# Patient Record
Sex: MALE | Race: WHITE | NOT HISPANIC OR LATINO | Employment: FULL TIME | URBAN - METROPOLITAN AREA
[De-identification: names, ages, dates, MRNs, and addresses within clinical notes are randomized per-mention and may not be internally consistent; named-entity substitution may affect disease eponyms.]

---

## 2017-12-29 DIAGNOSIS — N40.0 ENLARGED PROSTATE WITHOUT LOWER URINARY TRACT SYMPTOMS (LUTS): ICD-10-CM

## 2018-02-21 ENCOUNTER — OFFICE VISIT (OUTPATIENT)
Dept: UROLOGY | Facility: MEDICAL CENTER | Age: 63
End: 2018-02-21
Payer: COMMERCIAL

## 2018-02-21 VITALS
WEIGHT: 210 LBS | HEIGHT: 67 IN | BODY MASS INDEX: 32.96 KG/M2 | DIASTOLIC BLOOD PRESSURE: 80 MMHG | SYSTOLIC BLOOD PRESSURE: 142 MMHG

## 2018-02-21 DIAGNOSIS — N40.0 BENIGN PROSTATIC HYPERPLASIA WITHOUT LOWER URINARY TRACT SYMPTOMS: Primary | ICD-10-CM

## 2018-02-21 LAB
SL AMB  POCT GLUCOSE, UA: NORMAL
SL AMB LEUKOCYTE ESTERASE,UA: NEGATIVE
SL AMB POCT BILIRUBIN,UA: NEGATIVE
SL AMB POCT BLOOD,UA: NEGATIVE
SL AMB POCT CLARITY,UA: CLEAR
SL AMB POCT COLOR,UA: YELLOW
SL AMB POCT KETONES,UA: NEGATIVE
SL AMB POCT NITRITE,UA: NEGATIVE
SL AMB POCT PH,UA: 6
SL AMB POCT SPECIFIC GRAVITY,UA: 1.02
SL AMB POCT URINE PROTEIN: NEGATIVE
SL AMB POCT UROBILINOGEN: 1

## 2018-02-21 PROCEDURE — 99214 OFFICE O/P EST MOD 30 MIN: CPT | Performed by: UROLOGY

## 2018-02-21 PROCEDURE — 81003 URINALYSIS AUTO W/O SCOPE: CPT | Performed by: UROLOGY

## 2018-02-21 RX ORDER — SIMVASTATIN 20 MG
TABLET ORAL DAILY
COMMUNITY

## 2018-02-21 RX ORDER — HYDROCHLOROTHIAZIDE 25 MG/1
25 TABLET ORAL DAILY
COMMUNITY
Start: 2017-03-27

## 2018-02-21 RX ORDER — TAMSULOSIN HYDROCHLORIDE 0.4 MG/1
0.4 CAPSULE ORAL DAILY
COMMUNITY
Start: 2017-03-27 | End: 2018-02-21 | Stop reason: SDUPTHER

## 2018-02-21 RX ORDER — FINASTERIDE 5 MG/1
5 TABLET, FILM COATED ORAL DAILY
COMMUNITY
Start: 2016-11-03 | End: 2018-02-21 | Stop reason: SDUPTHER

## 2018-02-21 RX ORDER — FINASTERIDE 5 MG/1
5 TABLET, FILM COATED ORAL DAILY
Qty: 90 TABLET | Refills: 3 | Status: SHIPPED | OUTPATIENT
Start: 2018-02-21 | End: 2019-03-06

## 2018-02-21 RX ORDER — TAMSULOSIN HYDROCHLORIDE 0.4 MG/1
0.4 CAPSULE ORAL DAILY
Qty: 90 CAPSULE | Refills: 3 | Status: SHIPPED | OUTPATIENT
Start: 2018-02-21 | End: 2019-03-06

## 2018-02-21 RX ORDER — ZOLPIDEM TARTRATE 10 MG/1
TABLET ORAL DAILY
COMMUNITY

## 2018-02-21 RX ORDER — QUINAPRIL 40 MG/1
TABLET ORAL DAILY
COMMUNITY
Start: 2017-12-21

## 2018-02-21 NOTE — PROGRESS NOTES
IPSS Questionnaire (AUA-7): Over the past month    1)  How often have you had a sensation of not emptying your bladder completely after you finish urinating? 4 - More than half the time   2)  How often have you had to urinate again less than two hours after you finished urinating? 3 - About half the time   3)  How often have you found you stopped and started again several times when you urinated? 2 - Less than half the time   4) How difficult have you found it to postpone urination? 1 - Less than 1 time in 5   5) How often have you had a weak urinary stream?  3 - About half the time   6) How often have you had to push or strain to begin urination? 1 - Less than 1 time in 5   7) How many times did you most typically get up to urinate from the time you went to bed until the time you got up in the morning?   2 - 2 times   Total Score:  16

## 2018-02-21 NOTE — LETTER
February 21, 2018     DO Fadia Moon    Patient: Maribel Miranda   YOB: 1955   Date of Visit: 2/21/2018       Dear Dr Calderon Beebe: Thank you for referring Dolores López to me for evaluation  Below are my notes for this consultation  If you have questions, please do not hesitate to call me  I look forward to following your patient along with you  Sincerely,        Skyler Diaz MD        CC: No Recipients  Skyler Diaz MD  2/21/2018  1:24 PM  Sign at close encounter  Assessment/Plan:    Assessment:  1  Benign prostatic hyperplasia with obstruction  2  Remote history of testicular cancer  3  History of erectile dysfunction, currently inactive    Plan:  1  Return in 1 year  2  Obtain PSA  3  Re new medications    No problem-specific Assessment & Plan notes found for this encounter  Diagnoses and all orders for this visit:    Benign prostatic hyperplasia without lower urinary tract symptoms  -     POCT urine dip auto non-scope    Other orders  -     quinapril (ACCUPRIL) 40 MG tablet; daily  -     zolpidem (AMBIEN) 10 mg tablet; Take by mouth daily  -     tamsulosin (FLOMAX) 0 4 mg; Take 0 4 mg by mouth daily  -     simvastatin (ZOCOR) 20 mg tablet; Take by mouth daily  -     metFORMIN (GLUCOPHAGE) 500 mg tablet; Take 500 mg by mouth 2 (two) times a day  -     hydrochlorothiazide (HYDRODIURIL) 25 mg tablet; Take 25 mg by mouth daily  -     finasteride (PROSCAR) 5 mg tablet; Take 5 mg by mouth daily  -     ASPIRIN 81 PO; Take 81 mg by mouth daily  -     MEGARED OMEGA-3 KRILL OIL PO; Take by mouth daily          Subjective:      Patient ID: Maribel Miranda is a 57 y o  male  HPI  He notes urinary frequency, incomplete emptying, weak stream He denies other significant urinary symptoms  He denies gross hematuria, urinary tract infections or incontinence  He is taking tamsulosin and finasteride for his symptoms  PSA pending  Remote hx of testis cancer  Inactive problem  Hx of E D but pt and wife  content to live with situation  NIDDM - Good control  The following portions of the patient's history were reviewed and updated as appropriate: allergies, current medications, past family history, past medical history, past social history, past surgical history and problem list     Review of Systems   Constitutional: Negative for activity change and fatigue  NIDDM   Respiratory: Negative for shortness of breath and wheezing  Cardiovascular: Negative for chest pain  Gastrointestinal: Negative for abdominal pain  Genitourinary: Negative for difficulty urinating, dysuria, frequency, hematuria and urgency  Musculoskeletal: Negative for back pain and gait problem  Skin: Negative  Allergic/Immunologic: Negative  Neurological: Negative  Psychiatric/Behavioral: Negative  Objective:      /80 (BP Location: Left arm, Patient Position: Sitting, Cuff Size: Standard)   Ht 5' 7" (1 702 m)   Wt 95 3 kg (210 lb)   BMI 32 89 kg/m²           Physical Exam   Constitutional: He is oriented to person, place, and time  He appears well-developed and well-nourished  HENT:   Head: Normocephalic and atraumatic  Pulmonary/Chest: Effort normal    Abdominal: Soft  Bowel sounds are normal    Genitourinary:   Genitourinary Comments: The patient's prostate is approximately 40 g in size  Landmarks in texture normal   Anal sphincter tone is normal   Perineal structures are normal    Musculoskeletal: Normal range of motion  Neurological: He is alert and oriented to person, place, and time  Skin: Skin is warm and dry  Psychiatric: He has a normal mood and affect   His behavior is normal  Judgment and thought content normal

## 2018-02-21 NOTE — PROGRESS NOTES
Assessment/Plan:    Assessment:  1  Benign prostatic hyperplasia with obstruction  2  Remote history of testicular cancer  3  History of erectile dysfunction, currently inactive    Plan:  1  Return in 1 year  2  Obtain PSA  3  Re new medications    No problem-specific Assessment & Plan notes found for this encounter  Diagnoses and all orders for this visit:    Benign prostatic hyperplasia without lower urinary tract symptoms  -     POCT urine dip auto non-scope    Other orders  -     quinapril (ACCUPRIL) 40 MG tablet; daily  -     zolpidem (AMBIEN) 10 mg tablet; Take by mouth daily  -     tamsulosin (FLOMAX) 0 4 mg; Take 0 4 mg by mouth daily  -     simvastatin (ZOCOR) 20 mg tablet; Take by mouth daily  -     metFORMIN (GLUCOPHAGE) 500 mg tablet; Take 500 mg by mouth 2 (two) times a day  -     hydrochlorothiazide (HYDRODIURIL) 25 mg tablet; Take 25 mg by mouth daily  -     finasteride (PROSCAR) 5 mg tablet; Take 5 mg by mouth daily  -     ASPIRIN 81 PO; Take 81 mg by mouth daily  -     MEGARED OMEGA-3 KRILL OIL PO; Take by mouth daily          Subjective:      Patient ID: Hieu Merino is a 57 y o  male  HPI  He notes urinary frequency, incomplete emptying, weak stream He denies other significant urinary symptoms  He denies gross hematuria, urinary tract infections or incontinence  He is taking tamsulosin and finasteride for his symptoms  PSA pending  Remote hx of testis cancer  Inactive problem  Hx of E D but pt and wife  content to live with situation  NIDDM - Good control  The following portions of the patient's history were reviewed and updated as appropriate: allergies, current medications, past family history, past medical history, past social history, past surgical history and problem list     Review of Systems   Constitutional: Negative for activity change and fatigue  NIDDM   Respiratory: Negative for shortness of breath and wheezing      Cardiovascular: Negative for chest pain  Gastrointestinal: Negative for abdominal pain  Genitourinary: Negative for difficulty urinating, dysuria, frequency, hematuria and urgency  Musculoskeletal: Negative for back pain and gait problem  Skin: Negative  Allergic/Immunologic: Negative  Neurological: Negative  Psychiatric/Behavioral: Negative  Objective:      /80 (BP Location: Left arm, Patient Position: Sitting, Cuff Size: Standard)   Ht 5' 7" (1 702 m)   Wt 95 3 kg (210 lb)   BMI 32 89 kg/m²          Physical Exam   Constitutional: He is oriented to person, place, and time  He appears well-developed and well-nourished  HENT:   Head: Normocephalic and atraumatic  Pulmonary/Chest: Effort normal    Abdominal: Soft  Bowel sounds are normal    Genitourinary:   Genitourinary Comments: The patient's prostate is approximately 40 g in size  Landmarks in texture normal   Anal sphincter tone is normal   Perineal structures are normal    Musculoskeletal: Normal range of motion  Neurological: He is alert and oriented to person, place, and time  Skin: Skin is warm and dry  Psychiatric: He has a normal mood and affect   His behavior is normal  Judgment and thought content normal

## 2018-04-03 ENCOUNTER — TELEPHONE (OUTPATIENT)
Dept: UROLOGY | Facility: MEDICAL CENTER | Age: 63
End: 2018-04-03

## 2019-03-06 ENCOUNTER — OFFICE VISIT (OUTPATIENT)
Dept: UROLOGY | Facility: MEDICAL CENTER | Age: 64
End: 2019-03-06
Payer: COMMERCIAL

## 2019-03-06 VITALS
BODY MASS INDEX: 33.74 KG/M2 | WEIGHT: 215 LBS | SYSTOLIC BLOOD PRESSURE: 120 MMHG | DIASTOLIC BLOOD PRESSURE: 76 MMHG | HEIGHT: 67 IN | HEART RATE: 86 BPM

## 2019-03-06 DIAGNOSIS — N40.0 ENLARGED PROSTATE WITHOUT LOWER URINARY TRACT SYMPTOMS (LUTS): Primary | ICD-10-CM

## 2019-03-06 LAB
SL AMB  POCT GLUCOSE, UA: NEGATIVE
SL AMB LEUKOCYTE ESTERASE,UA: NEGATIVE
SL AMB POCT BILIRUBIN,UA: NEGATIVE
SL AMB POCT BLOOD,UA: NEGATIVE
SL AMB POCT CLARITY,UA: CLEAR
SL AMB POCT COLOR,UA: YELLOW
SL AMB POCT KETONES,UA: NEGATIVE
SL AMB POCT NITRITE,UA: NEGATIVE
SL AMB POCT PH,UA: 7
SL AMB POCT SPECIFIC GRAVITY,UA: 1.02
SL AMB POCT URINE PROTEIN: NEGATIVE
SL AMB POCT UROBILINOGEN: 1

## 2019-03-06 PROCEDURE — 81003 URINALYSIS AUTO W/O SCOPE: CPT | Performed by: UROLOGY

## 2019-03-06 PROCEDURE — 99214 OFFICE O/P EST MOD 30 MIN: CPT | Performed by: UROLOGY

## 2019-03-06 RX ORDER — FINASTERIDE 5 MG/1
5 TABLET, FILM COATED ORAL DAILY
Qty: 90 TABLET | Refills: 3 | Status: SHIPPED | OUTPATIENT
Start: 2019-03-06 | End: 2020-05-14 | Stop reason: SDUPTHER

## 2019-03-06 RX ORDER — TAMSULOSIN HYDROCHLORIDE 0.4 MG/1
0.4 CAPSULE ORAL
Qty: 90 CAPSULE | Refills: 3 | Status: SHIPPED | OUTPATIENT
Start: 2019-03-06 | End: 2020-04-06 | Stop reason: SDUPTHER

## 2019-03-06 NOTE — PROGRESS NOTES
Assessment/Plan:    Enlarged prostate without lower urinary tract symptoms (luts)  Urinary symptoms are well controlled on tamsulosin and finasteride  Prescriptions renewed  PSA pending  Return in 1 year  Diagnoses and all orders for this visit:    Enlarged prostate without lower urinary tract symptoms (luts)  -     POCT urine dip auto non-scope  -     finasteride (PROSCAR) 5 mg tablet; Take 1 tablet (5 mg total) by mouth daily  -     tamsulosin (FLOMAX) 0 4 mg; Take 1 capsule (0 4 mg total) by mouth daily with dinner  -     PSA, Total Screen; Future          Subjective:      Patient ID: Anthony Reynaga is a 70 y o  male  HPI  BPH:  He notes nocturia x 2  He denies other significant urinary symptoms  He denies gross hematuria, urinary tract infections or incontinence  He is taking tamsulosin and finasteride for his symptoms  PSA:  1 35 on March 16, 2018  2019 level has been ordered and is pending  AUA SYMPTOM SCORE      Most Recent Value   AUA SYMPTOM SCORE   How often have you had a sensation of not emptying your bladder completely after you finished urinating? 0   How often have you had to urinate again less than two hours after you finished urinating? 1   How often have you found you stopped and started again several times when you urinate? 1   How often have you found it difficult to postpone urination? 2   How often have you had a weak urinary stream?  1   How often have you had to push or strain to begin urination? 0   How many times did you most typically get up to urinate from the time you went to bed at night until the time you got up in the morning?   2   Quality of Life: If you were to spend the rest of your life with your urinary condition just the way it is now, how would you feel about that?  1   AUA SYMPTOM SCORE  7          The following portions of the patient's history were reviewed and updated as appropriate: allergies, current medications, past family history, past medical history, past social history, past surgical history and problem list     Review of Systems   Constitutional: Negative for activity change and fatigue  Respiratory: Negative for shortness of breath and wheezing  Cardiovascular: Negative for chest pain  Gastrointestinal: Negative for abdominal pain  Endocrine:        NIDDM  Still under control  Genitourinary: Negative for difficulty urinating, dysuria, frequency, hematuria and urgency  Remote hx of testis cancer  Inactive  Musculoskeletal: Negative for back pain and gait problem  Skin: Negative  Allergic/Immunologic: Negative  Neurological: Negative  Psychiatric/Behavioral: Negative  Objective:      /76 (BP Location: Left arm, Patient Position: Sitting, Cuff Size: Standard)   Pulse 86   Ht 5' 7" (1 702 m)   Wt 97 5 kg (215 lb)   BMI 33 67 kg/m²          Physical Exam   Constitutional: He is oriented to person, place, and time  He appears well-developed and well-nourished  HENT:   Head: Normocephalic and atraumatic  Eyes: EOM are normal    Neck: Normal range of motion  Neck supple  Pulmonary/Chest: Effort normal    Genitourinary: Rectum normal    Genitourinary Comments: The prostate is 40 grams, firm, smooth and non-tender   Musculoskeletal: Normal range of motion  Neurological: He is alert and oriented to person, place, and time  Skin: Skin is warm and dry  Psychiatric: He has a normal mood and affect   His behavior is normal  Judgment and thought content normal

## 2019-03-06 NOTE — ASSESSMENT & PLAN NOTE
Urinary symptoms are well controlled on tamsulosin and finasteride  Prescriptions renewed  PSA pending  Return in 1 year

## 2020-03-23 ENCOUNTER — TELEPHONE (OUTPATIENT)
Dept: UROLOGY | Facility: MEDICAL CENTER | Age: 65
End: 2020-03-23

## 2020-03-23 DIAGNOSIS — N40.0 ENLARGED PROSTATE WITHOUT LOWER URINARY TRACT SYMPTOMS (LUTS): Primary | ICD-10-CM

## 2020-04-02 ENCOUNTER — TELEPHONE (OUTPATIENT)
Dept: UROLOGY | Facility: MEDICAL CENTER | Age: 65
End: 2020-04-02

## 2020-04-06 DIAGNOSIS — N40.0 ENLARGED PROSTATE WITHOUT LOWER URINARY TRACT SYMPTOMS (LUTS): ICD-10-CM

## 2020-04-07 RX ORDER — TAMSULOSIN HYDROCHLORIDE 0.4 MG/1
0.4 CAPSULE ORAL
Qty: 90 CAPSULE | Refills: 0 | Status: SHIPPED | OUTPATIENT
Start: 2020-04-07 | End: 2020-09-28

## 2020-04-21 ENCOUNTER — TELEPHONE (OUTPATIENT)
Dept: UROLOGY | Facility: MEDICAL CENTER | Age: 65
End: 2020-04-21

## 2020-04-23 ENCOUNTER — TELEPHONE (OUTPATIENT)
Dept: UROLOGY | Facility: MEDICAL CENTER | Age: 65
End: 2020-04-23

## 2020-05-10 DIAGNOSIS — N40.0 ENLARGED PROSTATE WITHOUT LOWER URINARY TRACT SYMPTOMS (LUTS): ICD-10-CM

## 2020-05-11 ENCOUNTER — TELEPHONE (OUTPATIENT)
Dept: UROLOGY | Facility: MEDICAL CENTER | Age: 65
End: 2020-05-11

## 2020-05-11 RX ORDER — FINASTERIDE 5 MG/1
TABLET, FILM COATED ORAL
Qty: 90 TABLET | Refills: 3 | OUTPATIENT
Start: 2020-05-11

## 2020-05-14 RX ORDER — FINASTERIDE 5 MG/1
5 TABLET, FILM COATED ORAL DAILY
Qty: 30 TABLET | Refills: 0 | Status: SHIPPED | OUTPATIENT
Start: 2020-05-14 | End: 2020-09-28

## 2020-08-27 ENCOUNTER — OFFICE VISIT (OUTPATIENT)
Dept: UROLOGY | Facility: MEDICAL CENTER | Age: 65
End: 2020-08-27
Payer: COMMERCIAL

## 2020-08-27 VITALS
TEMPERATURE: 98.7 F | WEIGHT: 202 LBS | BODY MASS INDEX: 31.71 KG/M2 | HEART RATE: 100 BPM | HEIGHT: 67 IN | SYSTOLIC BLOOD PRESSURE: 120 MMHG | DIASTOLIC BLOOD PRESSURE: 78 MMHG

## 2020-08-27 DIAGNOSIS — N40.0 ENLARGED PROSTATE WITHOUT LOWER URINARY TRACT SYMPTOMS (LUTS): Primary | ICD-10-CM

## 2020-08-27 DIAGNOSIS — N52.8 OTHER MALE ERECTILE DYSFUNCTION: ICD-10-CM

## 2020-08-27 LAB
SL AMB  POCT GLUCOSE, UA: 100
SL AMB LEUKOCYTE ESTERASE,UA: ABNORMAL
SL AMB POCT BILIRUBIN,UA: ABNORMAL
SL AMB POCT BLOOD,UA: ABNORMAL
SL AMB POCT CLARITY,UA: CLEAR
SL AMB POCT COLOR,UA: YELLOW
SL AMB POCT KETONES,UA: ABNORMAL
SL AMB POCT NITRITE,UA: ABNORMAL
SL AMB POCT PH,UA: 5.5
SL AMB POCT SPECIFIC GRAVITY,UA: 1.02
SL AMB POCT URINE PROTEIN: ABNORMAL
SL AMB POCT UROBILINOGEN: 1

## 2020-08-27 PROCEDURE — 99214 OFFICE O/P EST MOD 30 MIN: CPT | Performed by: UROLOGY

## 2020-08-27 PROCEDURE — 81003 URINALYSIS AUTO W/O SCOPE: CPT | Performed by: UROLOGY

## 2020-08-27 RX ORDER — SILDENAFIL 50 MG/1
100 TABLET, FILM COATED ORAL AS NEEDED
Qty: 10 TABLET | Refills: 3 | Status: SHIPPED | OUTPATIENT
Start: 2020-08-27

## 2020-08-27 NOTE — PROGRESS NOTES
Assessment/Plan:    Enlarged prostate without lower urinary tract symptoms (luts)  Mildly symptomatic  Content with the status quo  Return in 1 year  Diagnoses and all orders for this visit:    Enlarged prostate without lower urinary tract symptoms (luts)  -     POCT urine dip auto non-scope    Other male erectile dysfunction  -     Testosterone, free, total; Future  -     Luteinizing hormone; Future  -     Sex Hormone Binding Globulin; Future  -     sildenafil (VIAGRA) 50 MG tablet; Take 2 tablets (100 mg total) by mouth as needed for erectile dysfunction          Subjective:      Patient ID: Tato Rosa is a 75 y o  male  HPI  BPH:  He notes minimal urinary sx  He denies other significant urinary symptoms  He denies gross hematuria, urinary tract infections or incontinence  He is taking finasteride and tamsulosin for his symptoms  PSA:  1 11 on May 8, 2020  AUA SYMPTOM SCORE      Most Recent Value   AUA SYMPTOM SCORE   How often have you had a sensation of not emptying your bladder completely after you finished urinating? 1   How often have you had to urinate again less than two hours after you finished urinating? 1   How often have you found you stopped and started again several times when you urinate?  0   How often have you found it difficult to postpone urination? 0   How often have you had a weak urinary stream?  1   How often have you had to push or strain to begin urination? 0   How many times did you most typically get up to urinate from the time you went to bed at night until the time you got up in the morning? 1   Quality of Life: If you were to spend the rest of your life with your urinary condition just the way it is now, how would you feel about that?  0   AUA SYMPTOM SCORE  4        Erectile dysfunction:  Trouble maintaining erection for the last few years  Getting worse and wife is complaining  No vasc hx  Non-smoker  Rarely drinks alcohol   No diabetic neuropathy  The following portions of the patient's history were reviewed and updated as appropriate: allergies, current medications, past family history, past medical history, past social history, past surgical history and problem list     Review of Systems    Constitutional: Negative for activity change and fatigue  Respiratory: Negative for shortness of breath and wheezing  Cardiovascular: HTN  Negative for chest pain  Gastrointestinal: Negative for abdominal pain  Endocrine:        NIDDM  Still under control  Genitourinary: Negative for difficulty urinating, dysuria, frequency, hematuria and urgency  Remote hx of testis cancer  Inactive  Musculoskeletal: Negative for back pain and gait problem  Skin: Negative  Allergic/Immunologic: Negative  Neurological: Negative  Psychiatric/Behavioral: Negative  Objective:      /78   Pulse 100   Temp 98 7 °F (37 1 °C)   Ht 5' 7" (1 702 m)   Wt 91 6 kg (202 lb)   BMI 31 64 kg/m²          Physical Exam  Constitutional:       Appearance: He is well-developed  HENT:      Head: Normocephalic and atraumatic  Neck:      Musculoskeletal: Normal range of motion and neck supple  Pulmonary:      Effort: Pulmonary effort is normal    Genitourinary:     Rectum: Normal       Comments: The prostate is 40 grams, firm, smooth and non-tender  Musculoskeletal: Normal range of motion  Skin:     General: Skin is warm and dry  Neurological:      Mental Status: He is alert and oriented to person, place, and time  Psychiatric:         Behavior: Behavior normal          Thought Content:  Thought content normal          Judgment: Judgment normal

## 2020-09-28 DIAGNOSIS — N40.0 ENLARGED PROSTATE WITHOUT LOWER URINARY TRACT SYMPTOMS (LUTS): ICD-10-CM

## 2020-09-28 RX ORDER — TAMSULOSIN HYDROCHLORIDE 0.4 MG/1
CAPSULE ORAL
Qty: 90 CAPSULE | Refills: 0 | Status: SHIPPED | OUTPATIENT
Start: 2020-09-28 | End: 2021-05-31 | Stop reason: SDUPTHER

## 2020-09-28 RX ORDER — FINASTERIDE 5 MG/1
TABLET, FILM COATED ORAL
Qty: 30 TABLET | Refills: 0 | Status: SHIPPED | OUTPATIENT
Start: 2020-09-28 | End: 2021-09-01

## 2021-05-31 DIAGNOSIS — N40.0 ENLARGED PROSTATE WITHOUT LOWER URINARY TRACT SYMPTOMS (LUTS): ICD-10-CM

## 2021-06-01 RX ORDER — TAMSULOSIN HYDROCHLORIDE 0.4 MG/1
0.4 CAPSULE ORAL
Qty: 90 CAPSULE | Refills: 1 | Status: SHIPPED | OUTPATIENT
Start: 2021-06-01 | End: 2021-09-01 | Stop reason: SDUPTHER

## 2021-06-01 NOTE — TELEPHONE ENCOUNTER
The patient has an upcoming office visit scheduled for 9/1/21 with Dr Keri Kamara in the [facility] location but will run out of medication until then    Request for same, 90 day supply with 1 refill was queued and forwarded to the Advanced Practitioner covering the [facility] location for approval

## 2021-09-01 ENCOUNTER — OFFICE VISIT (OUTPATIENT)
Dept: UROLOGY | Facility: MEDICAL CENTER | Age: 66
End: 2021-09-01
Payer: COMMERCIAL

## 2021-09-01 VITALS
WEIGHT: 208 LBS | HEART RATE: 93 BPM | BODY MASS INDEX: 32.65 KG/M2 | SYSTOLIC BLOOD PRESSURE: 120 MMHG | DIASTOLIC BLOOD PRESSURE: 80 MMHG | HEIGHT: 67 IN

## 2021-09-01 DIAGNOSIS — Z12.5 SPECIAL SCREENING FOR MALIGNANT NEOPLASM OF PROSTATE: ICD-10-CM

## 2021-09-01 DIAGNOSIS — N52.8 OTHER MALE ERECTILE DYSFUNCTION: ICD-10-CM

## 2021-09-01 DIAGNOSIS — N40.0 ENLARGED PROSTATE WITHOUT LOWER URINARY TRACT SYMPTOMS (LUTS): Primary | ICD-10-CM

## 2021-09-01 PROBLEM — E11.9 TYPE 2 DIABETES MELLITUS WITHOUT COMPLICATION, WITHOUT LONG-TERM CURRENT USE OF INSULIN (HCC): Status: ACTIVE | Noted: 2021-09-01

## 2021-09-01 LAB
SL AMB  POCT GLUCOSE, UA: 100
SL AMB LEUKOCYTE ESTERASE,UA: ABNORMAL
SL AMB POCT BILIRUBIN,UA: ABNORMAL
SL AMB POCT BLOOD,UA: ABNORMAL
SL AMB POCT CLARITY,UA: CLEAR
SL AMB POCT COLOR,UA: YELLOW
SL AMB POCT KETONES,UA: ABNORMAL
SL AMB POCT NITRITE,UA: ABNORMAL
SL AMB POCT PH,UA: 5.5
SL AMB POCT SPECIFIC GRAVITY,UA: 1.02
SL AMB POCT URINE PROTEIN: ABNORMAL
SL AMB POCT UROBILINOGEN: 0.2

## 2021-09-01 PROCEDURE — 81003 URINALYSIS AUTO W/O SCOPE: CPT | Performed by: UROLOGY

## 2021-09-01 PROCEDURE — 99214 OFFICE O/P EST MOD 30 MIN: CPT | Performed by: UROLOGY

## 2021-09-01 RX ORDER — TADALAFIL 20 MG/1
20 TABLET ORAL AS NEEDED
Qty: 10 TABLET | Refills: 3 | Status: SHIPPED | OUTPATIENT
Start: 2021-09-01

## 2021-09-01 RX ORDER — FINASTERIDE 5 MG/1
5 TABLET, FILM COATED ORAL DAILY
Qty: 90 TABLET | Refills: 3 | Status: SHIPPED | OUTPATIENT
Start: 2021-09-01

## 2021-09-01 RX ORDER — TAMSULOSIN HYDROCHLORIDE 0.4 MG/1
0.4 CAPSULE ORAL
Qty: 90 CAPSULE | Refills: 3 | Status: SHIPPED | OUTPATIENT
Start: 2021-09-01

## 2021-09-01 NOTE — PROGRESS NOTES
Assessment/Plan:    Other male erectile dysfunction  Using sildenafil but would like to do better  Trial of tadalafil  Enlarged prostate without lower urinary tract symptoms (luts)  Mildly-moderately symptomatic on tamsulosin combined with finasteride  PSA pending  Reassess in 1 year  Diagnoses and all orders for this visit:    Enlarged prostate without lower urinary tract symptoms (luts)  -     POCT urine dip auto non-scope  -     tamsulosin (FLOMAX) 0 4 mg; Take 1 capsule (0 4 mg total) by mouth daily with dinner  -     finasteride (PROSCAR) 5 mg tablet; Take 1 tablet (5 mg total) by mouth daily    Special screening for malignant neoplasm of prostate  -     PSA Total, Diagnostic; Future  -     PSA Total, Diagnostic; Future    Other male erectile dysfunction  -     tadalafil (CIALIS) 20 MG tablet; Take 1 tablet (20 mg total) by mouth as needed for erectile dysfunction          Subjective:      Patient ID: Brandon Snyder is a 77 y o  male  HPI  BPH:  He notes intermittency and weak stream   He denies other significant urinary symptoms  He denies gross hematuria, urinary tract infections or incontinence  He is taking tamsulosin and finasteride for his symptoms  PSA:  [No results found for: PSA]    AUA SYMPTOM SCORE      Most Recent Value   AUA SYMPTOM SCORE   How often have you had a sensation of not emptying your bladder completely after you finished urinating? 1   How often have you had to urinate again less than two hours after you finished urinating? 0   How often have you found you stopped and started again several times when you urinate? 2   How often have you found it difficult to postpone urination? 1   How often have you had a weak urinary stream?  2   How often have you had to push or strain to begin urination? 1   How many times did you most typically get up to urinate from the time you went to bed at night until the time you got up in the morning?   1   Quality of Life: If you were to spend the rest of your life with your urinary condition just the way it is now, how would you feel about that?  2   AUA SYMPTOM SCORE  8        Erectile dysfunction:   Using sildenafil 100 mg successfully  The following portions of the patient's history were reviewed and updated as appropriate: allergies, current medications, past family history, past medical history, past social history, past surgical history and problem list     Review of Systems   Constitutional: Negative for activity change and fatigue  Respiratory: Negative for shortness of breath and wheezing  Cardiovascular: Negative for chest pain  Hypertension  Gastrointestinal: Negative for abdominal pain  Endocrine:        NIDDM with good control     Genitourinary: Negative for difficulty urinating, dysuria, frequency, hematuria and urgency  Musculoskeletal: Negative for back pain and gait problem  Skin: Negative  Allergic/Immunologic: Negative  Neurological: Negative  Psychiatric/Behavioral: Negative  Objective:      /80   Pulse 93   Ht 5' 7" (1 702 m)   Wt 94 3 kg (208 lb)   BMI 32 58 kg/m²          Physical Exam  Constitutional:       Appearance: He is well-developed  HENT:      Head: Normocephalic and atraumatic  Pulmonary:      Effort: Pulmonary effort is normal    Genitourinary:     Rectum: Normal       Comments: The prostate is 50 g, smooth, non-tender  Musculoskeletal:         General: Normal range of motion  Cervical back: Normal range of motion and neck supple  Skin:     General: Skin is warm and dry  Neurological:      Mental Status: He is alert and oriented to person, place, and time  Psychiatric:         Behavior: Behavior normal          Thought Content:  Thought content normal          Judgment: Judgment normal

## 2021-09-02 PROBLEM — N52.8 OTHER MALE ERECTILE DYSFUNCTION: Status: ACTIVE | Noted: 2021-09-02

## 2021-09-02 NOTE — ASSESSMENT & PLAN NOTE
Mildly-moderately symptomatic on tamsulosin combined with finasteride  PSA pending  Reassess in 1 year

## 2022-08-17 ENCOUNTER — OFFICE VISIT (OUTPATIENT)
Dept: UROLOGY | Facility: MEDICAL CENTER | Age: 67
End: 2022-08-17
Payer: COMMERCIAL

## 2022-08-17 VITALS
WEIGHT: 191.4 LBS | BODY MASS INDEX: 30.04 KG/M2 | DIASTOLIC BLOOD PRESSURE: 80 MMHG | SYSTOLIC BLOOD PRESSURE: 140 MMHG | HEART RATE: 75 BPM | OXYGEN SATURATION: 97 % | HEIGHT: 67 IN

## 2022-08-17 DIAGNOSIS — Z85.47 HISTORY OF TESTICULAR CANCER: ICD-10-CM

## 2022-08-17 DIAGNOSIS — E11.69 ERECTILE DYSFUNCTION ASSOCIATED WITH TYPE 2 DIABETES MELLITUS (HCC): ICD-10-CM

## 2022-08-17 DIAGNOSIS — N52.1 ERECTILE DYSFUNCTION ASSOCIATED WITH TYPE 2 DIABETES MELLITUS (HCC): ICD-10-CM

## 2022-08-17 DIAGNOSIS — N13.8 BPH WITH OBSTRUCTION/LOWER URINARY TRACT SYMPTOMS: Primary | ICD-10-CM

## 2022-08-17 DIAGNOSIS — Z12.5 PROSTATE CANCER SCREENING: ICD-10-CM

## 2022-08-17 DIAGNOSIS — N40.1 BPH WITH OBSTRUCTION/LOWER URINARY TRACT SYMPTOMS: Primary | ICD-10-CM

## 2022-08-17 PROCEDURE — 99214 OFFICE O/P EST MOD 30 MIN: CPT | Performed by: UROLOGY

## 2022-08-17 RX ORDER — IBUPROFEN 600 MG/1
TABLET ORAL
COMMUNITY
Start: 2022-05-30

## 2022-08-17 RX ORDER — CYCLOBENZAPRINE HCL 5 MG
TABLET ORAL
COMMUNITY
Start: 2022-06-01

## 2022-08-17 RX ORDER — CEPHALEXIN 500 MG/1
500 CAPSULE ORAL 4 TIMES DAILY
COMMUNITY
Start: 2022-06-01

## 2022-08-17 NOTE — PROGRESS NOTES
Assessment/Plan:   1  BPH with lower urinary tract symptoms  The patient has relatively high AUA score despite finasteride and tamsulosin  AUA score is between 22 and 23 on these medications  Suggest cystoscopy uroflow PVR and consideration towards either TURP or Uro lift  2  Erectile dysfunction secondary to type 2 diabetes mellitus refractory to PDE 5 inhibitors-will discuss further in view of his lack of response  3  Prostate cancer screening-PSA is 1 6 on finasteride with ALDAIR showing no evidence of nodularity or suspicion of malignancy  No problem-specific Assessment & Plan notes found for this encounter  Diagnoses and all orders for this visit:    BPH with obstruction/lower urinary tract symptoms    Erectile dysfunction associated with type 2 diabetes mellitus ([facility] [address] )    Prostate cancer screening    Other orders  -     ibuprofen (MOTRIN) 600 mg tablet  -     cyclobenzaprine (FLEXERIL) 5 mg tablet; TAKE 1-2 TABLETS BY MOUTH 3 TIMES A DAY AS NEEDED FOR MUSCLE SPASMS  -     cephalexin (KEFLEX) 500 mg capsule; Take 500 mg by mouth 4 (four) times a day          Subjective:      Patient ID: Serafin Gomez is a 74 y o  male  HPI  58-year-old male followed previously by Dr Dieudonne Deal and prior to that Dr Lee Shepherd presents for urologic follow-up  He notes nocturia 2-3 times per night weakening of the urinary stream intermittency urgency and frequency with some straining to urinate  The symptoms are all while on alpha blockade and 5 alpha reductase inhibition  He notes that he does not respond to Cialis 20 mg on demand and is currently not getting any erectile activity  He presents for follow-up and evaluation    The following portions of the patient's history were reviewed and updated as appropriate: allergies, current medications, past family history, past medical history, past social history, past surgical history and problem list     Review of Systems   Genitourinary: Positive for frequency and urgency  AUA symptom score is between 21 and 22 with nocturia 2-3 times per night 3/5 weakening urinary stream frequency and incomplete emptying with 4/5 urgency and intermittency  2/5 straining   Musculoskeletal: Positive for myalgias  All other systems reviewed and are negative  Objective:      /80   Pulse 75   Ht 5' 7" (1 702 m)   Wt 86 8 kg (191 lb 6 4 oz)   SpO2 97%   BMI 29 98 kg/m²          Physical Exam  Vitals reviewed  Constitutional:       General: He is not in acute distress  Appearance: Normal appearance  He is not ill-appearing, toxic-appearing or diaphoretic  HENT:      Head: Normocephalic and atraumatic  Nose: Nose normal       Mouth/Throat:      Mouth: Mucous membranes are moist    Eyes:      Extraocular Movements: Extraocular movements intact  Pulmonary:      Effort: Pulmonary effort is normal  No respiratory distress  Abdominal:      General: There is no distension  Palpations: Abdomen is soft  Genitourinary:     Comments: Phallus normal uncircumcised without cutaneous lesions  Meatus patent normally placed  Scrotum normal without cutaneous lesions  There was surgical absence of the right testicle consistent with previous radical orchiectomy  The left testis and adnexa are palpably normal without masses tenderness or adnexal abnormality  ALDAIR normal anal verge normal anal sphincter tone no palpable rectal masses  Prostate approximately 45-50 g by digital estimate without nodularity or tenderness  Musculoskeletal:         General: Normal range of motion  Skin:     General: Skin is dry  Neurological:      Mental Status: He is alert and oriented to person, place, and time  Psychiatric:         Mood and Affect: Mood normal          Behavior: Behavior normal          Thought Content:  Thought content normal          Judgment: Judgment normal

## 2022-09-06 DIAGNOSIS — N40.0 ENLARGED PROSTATE WITHOUT LOWER URINARY TRACT SYMPTOMS (LUTS): ICD-10-CM

## 2022-09-14 DIAGNOSIS — N40.0 ENLARGED PROSTATE WITHOUT LOWER URINARY TRACT SYMPTOMS (LUTS): ICD-10-CM

## 2022-10-27 ENCOUNTER — PROCEDURE VISIT (OUTPATIENT)
Dept: UROLOGY | Facility: MEDICAL CENTER | Age: 67
End: 2022-10-27
Payer: COMMERCIAL

## 2022-10-27 VITALS
HEIGHT: 67 IN | SYSTOLIC BLOOD PRESSURE: 148 MMHG | WEIGHT: 190 LBS | HEART RATE: 72 BPM | DIASTOLIC BLOOD PRESSURE: 78 MMHG | BODY MASS INDEX: 29.82 KG/M2

## 2022-10-27 DIAGNOSIS — N40.1 BPH WITH OBSTRUCTION/LOWER URINARY TRACT SYMPTOMS: Primary | ICD-10-CM

## 2022-10-27 DIAGNOSIS — N13.8 BPH WITH OBSTRUCTION/LOWER URINARY TRACT SYMPTOMS: Primary | ICD-10-CM

## 2022-10-27 DIAGNOSIS — Z12.5 PROSTATE CANCER SCREENING: ICD-10-CM

## 2022-10-27 DIAGNOSIS — N52.1 ERECTILE DYSFUNCTION ASSOCIATED WITH TYPE 2 DIABETES MELLITUS (HCC): ICD-10-CM

## 2022-10-27 DIAGNOSIS — E11.69 ERECTILE DYSFUNCTION ASSOCIATED WITH TYPE 2 DIABETES MELLITUS (HCC): ICD-10-CM

## 2022-10-27 DIAGNOSIS — Z85.47 HISTORY OF TESTICULAR CANCER: ICD-10-CM

## 2022-10-27 LAB
SL AMB  POCT GLUCOSE, UA: NORMAL
SL AMB LEUKOCYTE ESTERASE,UA: NORMAL
SL AMB POCT BILIRUBIN,UA: NORMAL
SL AMB POCT BLOOD,UA: NORMAL
SL AMB POCT CLARITY,UA: CLEAR
SL AMB POCT COLOR,UA: YELLOW
SL AMB POCT KETONES,UA: NORMAL
SL AMB POCT NITRITE,UA: NORMAL
SL AMB POCT PH,UA: 5.5
SL AMB POCT SPECIFIC GRAVITY,UA: 1.01
SL AMB POCT URINE PROTEIN: NORMAL
SL AMB POCT UROBILINOGEN: 0.2

## 2022-10-27 PROCEDURE — 99214 OFFICE O/P EST MOD 30 MIN: CPT | Performed by: UROLOGY

## 2022-10-27 PROCEDURE — 52000 CYSTOURETHROSCOPY: CPT | Performed by: UROLOGY

## 2022-10-27 PROCEDURE — 76872 US TRANSRECTAL: CPT | Performed by: UROLOGY

## 2022-10-27 PROCEDURE — 81003 URINALYSIS AUTO W/O SCOPE: CPT | Performed by: UROLOGY

## 2022-10-27 RX ORDER — SULFAMETHOXAZOLE AND TRIMETHOPRIM 800; 160 MG/1; MG/1
1 TABLET ORAL EVERY 12 HOURS SCHEDULED
Qty: 4 TABLET | Refills: 0 | Status: SHIPPED | OUTPATIENT
Start: 2022-10-27 | End: 2022-10-29

## 2022-10-27 NOTE — H&P
H&P Exam - Urology   Lacie Cordero 74 y o  male MRN: [id number]  Unit/Bed#:  Encounter: [id number]    Assessment/Plan     Assessment:  BPH with lower urinary tract obstructive symptoms AUA symptom score 23 on both alpha blockade and 5 alpha reductase inhibition  Erectile dysfunction secondary to type 2 diabetes mellitus    Plan:  TURP    History of Present Illness   HPI:  Lacie Cordero is a 74 y o  male who presents with a longstanding history of obstructive and irritative voiding symptoms  The patient was followed by Dr Weston Greer and had an AUA symptom score between 22 and 23 on finasteride and tamsulosin  Cystourethroscopy revealed trilobar hyperplasia of the prostate with visual occlusion of the bladder outlet and a significant amount of intravesical median lobe tissue  The patient was offered a variety of interventions including HoLEP laser prostatectomy TURP UroLift  The patient has chosen TURP understanding risks of retrograde ejaculation erectile dysfunction urinary incontinence bleeding contracture stricture potential need for a Rojas catheter potential urinary retention and potential need for additional operative interventions  The patient expressed understanding and provided both verbal and signed informed consent       Review of Systems   Genitourinary: Positive for difficulty urinating, frequency and urgency  See HPI     Musculoskeletal: Positive for arthralgias and myalgias  All other systems reviewed and are negative        Historical Information   Past Medical History:   Diagnosis Date   • BPH with obstruction/lower urinary tract symptoms    • Elevated PSA    • Erectile dysfunction    • Hypercholesteremia    • Hypertension    • Malignant neoplasm of testis [facility])    • Nocturia      Past Surgical History:   Procedure Laterality Date   • LAPAROSCOPIC ORCHIECTOMY  1998   • PROSTATE BIOPSY  2012, 2013   • ROTATOR CUFF REPAIR Left      Social History   Social History Substance and Sexual Activity   Alcohol Use Yes    Comment: Drinks socially  Social History     Substance and Sexual Activity   Drug Use No     Social History     Tobacco Use   Smoking Status Never Smoker   Smokeless Tobacco Never Used     Family History:   Family History   Problem Relation Age of Onset   • Liver cancer Mother    • Colon cancer Mother        Meds/Allergies   all medications and allergies reviewed  No Known Allergies    Objective   Vitals: Blood pressure 148/78, pulse 72, height 5' 7" (1 702 m), weight 86 2 kg (190 lb)  [unfilled]    Invasive Devices  Report    None                 Physical Exam  Vitals reviewed  Constitutional:       General: He is not in acute distress  Appearance: Normal appearance  He is not ill-appearing, toxic-appearing or diaphoretic  HENT:      Head: Normocephalic and atraumatic  Nose: Nose normal       Mouth/Throat:      Mouth: Mucous membranes are moist    Eyes:      Extraocular Movements: Extraocular movements intact  Cardiovascular:      Rate and Rhythm: Normal rate and regular rhythm  Heart sounds: Normal heart sounds  Pulmonary:      Effort: Pulmonary effort is normal  No respiratory distress  Breath sounds: No wheezing, rhonchi or rales  Abdominal:      General: Bowel sounds are normal  There is no distension  Palpations: Abdomen is soft  Tenderness: There is no abdominal tenderness  Genitourinary:     Penis: Normal        Comments: Phallus uncircumcised with normally placed patent meatus  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  Musculoskeletal:      Cervical back: Neck supple  Skin:     General: Skin is dry  Neurological:      Mental Status: He is alert and oriented to person, place, and time  Psychiatric:         Mood and Affect: Mood normal          Behavior: Behavior normal          Thought Content:  Thought content normal          Judgment: Judgment normal          Lab Results: I have personally reviewed pertinent reports  Imaging: I have personally reviewed pertinent reports  and I have personally reviewed pertinent films in PACS  EKG, Pathology, and Other Studies: I have personally reviewed pertinent reports  and I have personally reviewed pertinent films in PACS  VTE Prophylaxis: Sequential compression device Lornasilviano Blum)     Code Status: [unfilled]  Advance Directive and Living Will:      Power of :    POLST:      Counseling / Coordination of Care  Total floor / unit time spent today 30 minutes  Greater than 50% of total time was spent with the patient and / or family counseling and / or coordination of care  A description of the counseling / coordination of care:           Cystoscopy     Date/Time 10/27/2022 2:01 PM     Performed by  Richard Huang MD     Authorized by Richard Huang MD      Universal Protocol:  Consent: Verbal consent obtained  Written consent obtained  Risks and benefits: risks, benefits and alternatives were discussed  Consent given by: patient  Time out: Immediately prior to procedure a "time out" was called to verify the correct patient, procedure, equipment, support staff and site/side marked as required    Patient understanding: patient states understanding of the procedure being performed  Patient consent: the patient's understanding of the procedure matches consent given  Procedure consent: procedure consent matches procedure scheduled  Required items: required blood products, implants, devices, and special equipment available  Patient identity confirmed: verbally with patient        Procedure Details:  Procedure type: cystoscopy    Patient tolerance: Patient tolerated the procedure well with no immediate complications    Additional Procedure Details: With the patient in supine position after prepping the urethral meatus with Betadine instilling 2 percent lidocaine lubricant per urethra and leaving it indwelling for 5 minutes cystourethroscopy was performed revealing a normal anterior urethra without stricture or lesion  The prostatic urethra revealed bilobar enlargement of the lateral lobes of the prostate with visual occlusion of the bladder outlet  There was a significant median lobe with intravesical component as well that appeared to be obstructive  Urinary bladder was severely trabeculated with posterior wall cellularity  Ureteral orifices in normal position and configuration bilaterally with clear efflux bilaterally  Retroflexion revealed intravesical median lobe without any other bladder neck abnormalities  The cystoscope was removed atraumatically and the patient was able to void postoperatively  The patient was then placed in the left lateral decubitus position and a condom covered lubricated ultrasound probe was placed per anus into the rectal vault with sagittal and transverse imaging of the prostate and seminal vesicles taking place  No hypoechoic peripheral zone lesions were identified, seminal vesicles appeared normal, there were some scattered periurethral calcifications noted  Prostate size was calculated at 49 cc with a 51 millimeter transverse diameter  The probe was removed and the patient recovered uneventfully  Malaika Meade

## 2022-10-27 NOTE — PROGRESS NOTES
H&P Exam - Urology   Feliberto Ignacio 74 y o  male MRN: [id number]  Unit/Bed#:  Encounter: [id number]    Assessment/Plan     Assessment:  BPH with lower urinary tract obstructive symptoms AUA symptom score 23 on both alpha blockade and 5 alpha reductase inhibition  Erectile dysfunction secondary to type 2 diabetes mellitus    Plan:  TURP    History of Present Illness   HPI:  Feliberto Ignacio is a 74 y o  male who presents with a longstanding history of obstructive and irritative voiding symptoms  The patient was followed by Dr Saqib Manzanares and had an AUA symptom score between 22 and 23 on finasteride and tamsulosin  Cystourethroscopy revealed trilobar hyperplasia of the prostate with visual occlusion of the bladder outlet and a significant amount of intravesical median lobe tissue  The patient was offered a variety of interventions including HoLEP laser prostatectomy TURP UroLift  The patient has chosen TURP understanding risks of retrograde ejaculation erectile dysfunction urinary incontinence bleeding contracture stricture potential need for a Rojas catheter potential urinary retention and potential need for additional operative interventions  The patient expressed understanding and provided both verbal and signed informed consent       Review of Systems   Genitourinary: Positive for difficulty urinating, frequency and urgency  See HPI     Musculoskeletal: Positive for arthralgias and myalgias  All other systems reviewed and are negative        Historical Information   Past Medical History:   Diagnosis Date   • BPH with obstruction/lower urinary tract symptoms    • Elevated PSA    • Erectile dysfunction    • Hypercholesteremia    • Hypertension    • Malignant neoplasm of testis [facility])    • Nocturia      Past Surgical History:   Procedure Laterality Date   • LAPAROSCOPIC ORCHIECTOMY  1998   • PROSTATE BIOPSY  2012, 2013   • ROTATOR CUFF REPAIR Left      Social History   Social History Substance and Sexual Activity   Alcohol Use Yes    Comment: Drinks socially  Social History     Substance and Sexual Activity   Drug Use No     Social History     Tobacco Use   Smoking Status Never Smoker   Smokeless Tobacco Never Used     Family History:   Family History   Problem Relation Age of Onset   • Liver cancer Mother    • Colon cancer Mother        Meds/Allergies   all medications and allergies reviewed  No Known Allergies    Objective   Vitals: Blood pressure 148/78, pulse 72, height 5' 7" (1 702 m), weight 86 2 kg (190 lb)  [unfilled]    Invasive Devices  Report    None                 Physical Exam  Vitals reviewed  Constitutional:       General: He is not in acute distress  Appearance: Normal appearance  He is not ill-appearing, toxic-appearing or diaphoretic  HENT:      Head: Normocephalic and atraumatic  Nose: Nose normal       Mouth/Throat:      Mouth: Mucous membranes are moist    Eyes:      Extraocular Movements: Extraocular movements intact  Cardiovascular:      Rate and Rhythm: Normal rate and regular rhythm  Heart sounds: Normal heart sounds  Pulmonary:      Effort: Pulmonary effort is normal  No respiratory distress  Breath sounds: No wheezing, rhonchi or rales  Abdominal:      General: Bowel sounds are normal  There is no distension  Palpations: Abdomen is soft  Tenderness: There is no abdominal tenderness  Genitourinary:     Penis: Normal        Comments: Phallus uncircumcised with normally placed patent meatus  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  Musculoskeletal:      Cervical back: Neck supple  Skin:     General: Skin is dry  Neurological:      Mental Status: He is alert and oriented to person, place, and time  Psychiatric:         Mood and Affect: Mood normal          Behavior: Behavior normal          Thought Content:  Thought content normal          Judgment: Judgment normal          Lab Results: I have personally reviewed pertinent reports  Imaging: I have personally reviewed pertinent reports  and I have personally reviewed pertinent films in PACS  EKG, Pathology, and Other Studies: I have personally reviewed pertinent reports  and I have personally reviewed pertinent films in PACS  VTE Prophylaxis: Sequential compression device Zaki Corral)     Code Status: [unfilled]  Advance Directive and Living Will:      Power of :    POLST:      Counseling / Coordination of Care  Total floor / unit time spent today 30 minutes  Greater than 50% of total time was spent with the patient and / or family counseling and / or coordination of care  A description of the counseling / coordination of care:           Cystoscopy     Date/Time 10/27/2022 2:01 PM     Performed by  Beth Verdin MD     Authorized by Beth Verdin MD      Universal Protocol:  Consent: Verbal consent obtained  Written consent obtained  Risks and benefits: risks, benefits and alternatives were discussed  Consent given by: patient  Time out: Immediately prior to procedure a "time out" was called to verify the correct patient, procedure, equipment, support staff and site/side marked as required    Patient understanding: patient states understanding of the procedure being performed  Patient consent: the patient's understanding of the procedure matches consent given  Procedure consent: procedure consent matches procedure scheduled  Required items: required blood products, implants, devices, and special equipment available  Patient identity confirmed: verbally with patient        Procedure Details:  Procedure type: cystoscopy    Patient tolerance: Patient tolerated the procedure well with no immediate complications    Additional Procedure Details: With the patient in supine position after prepping the urethral meatus with Betadine instilling 2 percent lidocaine lubricant per urethra and leaving it indwelling for 5 minutes cystourethroscopy was performed revealing a normal anterior urethra without stricture or lesion  The prostatic urethra revealed bilobar enlargement of the lateral lobes of the prostate with visual occlusion of the bladder outlet  There was a significant median lobe with intravesical component as well that appeared to be obstructive  Urinary bladder was severely trabeculated with posterior wall cellularity  Ureteral orifices in normal position and configuration bilaterally with clear efflux bilaterally  Retroflexion revealed intravesical median lobe without any other bladder neck abnormalities  The cystoscope was removed atraumatically and the patient was able to void postoperatively  The patient was then placed in the left lateral decubitus position and a condom covered lubricated ultrasound probe was placed per anus into the rectal vault with sagittal and transverse imaging of the prostate and seminal vesicles taking place  No hypoechoic peripheral zone lesions were identified, seminal vesicles appeared normal, there were some scattered periurethral calcifications noted  Prostate size was calculated at 49 cc with a 51 millimeter transverse diameter  The probe was removed and the patient recovered uneventfully  Roman Reyna

## 2022-10-27 NOTE — LETTER
October 27, 2022     Martin Alu, DO  [address] [place]  [facility]    Patient: Antonino Odom   YOB: 1955   Date of Visit: 10/27/2022       Dear Dr Maggi Curry: Thank you for referring Garo Butler to me for evaluation  Below are my notes for this consultation  If you have questions, please do not hesitate to call me  I look forward to following your patient along with you  Sincerely,        Chano Bolanos MD        CC: No Recipients  Chano Bolanos MD  10/27/2022  2:03 PM  Sign when Signing Visit  H&P Exam - Urology   Antonino Odom 74 y o  male MRN: [id number]  Unit/Bed#:  Encounter: [id number]    Assessment/Plan     Assessment:  BPH with lower urinary tract obstructive symptoms AUA symptom score 23 on both alpha blockade and 5 alpha reductase inhibition  Erectile dysfunction secondary to type 2 diabetes mellitus    Plan:  TURP    History of Present Illness   HPI:  Antonino Odom is a 74 y o  male who presents with a longstanding history of obstructive and irritative voiding symptoms  The patient was followed by Dr Clifford Garay and had an AUA symptom score between 22 and 23 on finasteride and tamsulosin  Cystourethroscopy revealed trilobar hyperplasia of the prostate with visual occlusion of the bladder outlet and a significant amount of intravesical median lobe tissue  The patient was offered a variety of interventions including HoLEP laser prostatectomy TURP UroLift  The patient has chosen TURP understanding risks of retrograde ejaculation erectile dysfunction urinary incontinence bleeding contracture stricture potential need for a Rojas catheter potential urinary retention and potential need for additional operative interventions  The patient expressed understanding and provided both verbal and signed informed consent       Review of Systems   Genitourinary: Positive for difficulty urinating, frequency and urgency          See HPI Musculoskeletal: Positive for arthralgias and myalgias  All other systems reviewed and are negative  Historical Information   Past Medical History:   Diagnosis Date   • BPH with obstruction/lower urinary tract symptoms    • Elevated PSA    • Erectile dysfunction    • Hypercholesteremia    • Hypertension    • Malignant neoplasm of testis [facility])    • Nocturia      Past Surgical History:   Procedure Laterality Date   • LAPAROSCOPIC ORCHIECTOMY  1998   • PROSTATE BIOPSY  2012, 2013   • ROTATOR CUFF REPAIR Left      Social History   Social History     Substance and Sexual Activity   Alcohol Use Yes    Comment: Drinks socially  Social History     Substance and Sexual Activity   Drug Use No     Social History     Tobacco Use   Smoking Status Never Smoker   Smokeless Tobacco Never Used     Family History:   Family History   Problem Relation Age of Onset   • Liver cancer Mother    • Colon cancer Mother        Meds/Allergies   all medications and allergies reviewed  No Known Allergies    Objective   Vitals: Blood pressure 148/78, pulse 72, height 5' 7" (1 702 m), weight 86 2 kg (190 lb)  [unfilled]    Invasive Devices  Report    None                 Physical Exam  Vitals reviewed  Constitutional:       General: He is not in acute distress  Appearance: Normal appearance  He is not ill-appearing, toxic-appearing or diaphoretic  HENT:      Head: Normocephalic and atraumatic  Nose: Nose normal       Mouth/Throat:      Mouth: Mucous membranes are moist    Eyes:      Extraocular Movements: Extraocular movements intact  Cardiovascular:      Rate and Rhythm: Normal rate and regular rhythm  Heart sounds: Normal heart sounds  Pulmonary:      Effort: Pulmonary effort is normal  No respiratory distress  Breath sounds: No wheezing, rhonchi or rales  Abdominal:      General: Bowel sounds are normal  There is no distension  Palpations: Abdomen is soft  Tenderness:  There is no abdominal tenderness  Genitourinary:     Penis: Normal        Comments: Phallus uncircumcised with normally placed patent meatus  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  Musculoskeletal:      Cervical back: Neck supple  Skin:     General: Skin is dry  Neurological:      Mental Status: He is alert and oriented to person, place, and time  Psychiatric:         Mood and Affect: Mood normal          Behavior: Behavior normal          Thought Content: Thought content normal          Judgment: Judgment normal          Lab Results: I have personally reviewed pertinent reports  Imaging: I have personally reviewed pertinent reports  and I have personally reviewed pertinent films in PACS  EKG, Pathology, and Other Studies: I have personally reviewed pertinent reports  and I have personally reviewed pertinent films in PACS  VTE Prophylaxis: Sequential compression device Margretta Solum)     Code Status: [unfilled]  Advance Directive and Living Will:      Power of :    POLST:      Counseling / Coordination of Care  Total floor / unit time spent today 30 minutes  Greater than 50% of total time was spent with the patient and / or family counseling and / or coordination of care  A description of the counseling / coordination of care:           Cystoscopy     Date/Time 10/27/2022 2:01 PM     Performed by  Lilian Hernandez MD     Authorized by Lilian Hernandez MD      Universal Protocol:  Consent: Verbal consent obtained  Written consent obtained  Risks and benefits: risks, benefits and alternatives were discussed  Consent given by: patient  Time out: Immediately prior to procedure a "time out" was called to verify the correct patient, procedure, equipment, support staff and site/side marked as required    Patient understanding: patient states understanding of the procedure being performed  Patient consent: the patient's understanding of the procedure matches consent given  Procedure consent: procedure consent matches procedure scheduled  Required items: required blood products, implants, devices, and special equipment available  Patient identity confirmed: verbally with patient        Procedure Details:  Procedure type: cystoscopy    Patient tolerance: Patient tolerated the procedure well with no immediate complications    Additional Procedure Details: With the patient in supine position after prepping the urethral meatus with Betadine instilling 2 percent lidocaine lubricant per urethra and leaving it indwelling for 5 minutes cystourethroscopy was performed revealing a normal anterior urethra without stricture or lesion  The prostatic urethra revealed bilobar enlargement of the lateral lobes of the prostate with visual occlusion of the bladder outlet  There was a significant median lobe with intravesical component as well that appeared to be obstructive  Urinary bladder was severely trabeculated with posterior wall cellularity  Ureteral orifices in normal position and configuration bilaterally with clear efflux bilaterally  Retroflexion revealed intravesical median lobe without any other bladder neck abnormalities  The cystoscope was removed atraumatically and the patient was able to void postoperatively  The patient was then placed in the left lateral decubitus position and a condom covered lubricated ultrasound probe was placed per anus into the rectal vault with sagittal and transverse imaging of the prostate and seminal vesicles taking place  No hypoechoic peripheral zone lesions were identified, seminal vesicles appeared normal, there were some scattered periurethral calcifications noted  Prostate size was calculated at 49 cc with a 51 millimeter transverse diameter  The probe was removed and the patient recovered uneventfully  Karin Bustillo

## 2022-11-15 ENCOUNTER — TELEPHONE (OUTPATIENT)
Dept: UROLOGY | Facility: AMBULATORY SURGERY CENTER | Age: 67
End: 2022-11-15

## 2022-11-15 ENCOUNTER — TELEPHONE (OUTPATIENT)
Dept: UROLOGY | Facility: MEDICAL CENTER | Age: 67
End: 2022-11-15

## 2022-11-15 DIAGNOSIS — N40.0 ENLARGED PROSTATE WITHOUT LOWER URINARY TRACT SYMPTOMS (LUTS): ICD-10-CM

## 2022-11-15 RX ORDER — FINASTERIDE 5 MG/1
5 TABLET, FILM COATED ORAL DAILY
Qty: 90 TABLET | Refills: 3 | Status: SHIPPED | OUTPATIENT
Start: 2022-11-15

## 2022-11-15 RX ORDER — FINASTERIDE 5 MG/1
TABLET, FILM COATED ORAL
Qty: 90 TABLET | Refills: 3 | Status: SHIPPED | OUTPATIENT
Start: 2022-11-15 | End: 2022-11-15 | Stop reason: SDUPTHER

## 2022-11-15 RX ORDER — TAMSULOSIN HYDROCHLORIDE 0.4 MG/1
CAPSULE ORAL
Qty: 90 CAPSULE | Refills: 3 | Status: SHIPPED | OUTPATIENT
Start: 2022-11-15 | End: 2022-11-15 | Stop reason: SDUPTHER

## 2022-11-15 RX ORDER — TAMSULOSIN HYDROCHLORIDE 0.4 MG/1
0.4 CAPSULE ORAL
Qty: 90 CAPSULE | Refills: 3 | Status: SHIPPED | OUTPATIENT
Start: 2022-11-15 | End: 2022-11-21 | Stop reason: SDUPTHER

## 2022-11-15 NOTE — TELEPHONE ENCOUNTER
Patient is calling to request 2 prescription refills and is completely out of both    Medication: Finasteride 5 mg and Tamsulosin 0 4 mg    30 / 90 day supply: 90 day     Pharmacy: [facility] on [address] in [address] [place] [address] can be reached at: [phone]

## 2022-11-15 NOTE — TELEPHONE ENCOUNTER
The patient was last seen on 8/17/22 by Dr Anil Dumont in the [facility] location; continuation of the drug was authorized at that time   Request for same, 90 day supply with 3 refills was queued and forwarded to the Advanced Practitioner covering the [facility] location for approval

## 2022-11-15 NOTE — TELEPHONE ENCOUNTER
LON for 1/6/2023 at [facility] with Dr Dionisio Mejia     -instructions  Emailed  -CBC, CMP, T&S, Urine C&S and EKG  2 weeks prior  -patient will avoid any potentially blood thinning medications 7 days prior  -[facility] - on auth tracker 11/15/2022

## 2022-11-15 NOTE — TELEPHONE ENCOUNTER
Patient returning call to schedule surgery  Spoke to surgery scheduler Melo Greenwood and  offered patient 1/6/23 at [facility]  Patient confirmed that this date will work and was informed about the instruction packet that will be sent to him in the mail

## 2022-11-15 NOTE — TELEPHONE ENCOUNTER
The patient was last seen on 8/17/22 by Dr Jose Lopez in the [facility] location; continuation of both drugs was authorized at that time  After further investigation, it appears the pharmacy last sent refill requests to DR ANDREA'S attention back in September 2022 but they went unaddressed  Script will be used from those messages as they are currently jamming up the works to refill on this message vine  No further documentation will occur here

## 2022-11-15 NOTE — TELEPHONE ENCOUNTER
The patient was last seen on 8/17/22 by Dr Chi Canales in the [facility] location; continuation of the drug was authorized at that time    Request for same, 90 day supply with 3 refills was queued and forwarded to the Advanced Practitioner covering the [facility] location for approval

## 2022-11-21 ENCOUNTER — TELEPHONE (OUTPATIENT)
Dept: OTHER | Facility: OTHER | Age: 67
End: 2022-11-21

## 2022-11-21 DIAGNOSIS — N40.0 ENLARGED PROSTATE WITHOUT LOWER URINARY TRACT SYMPTOMS (LUTS): ICD-10-CM

## 2022-11-21 NOTE — TELEPHONE ENCOUNTER
Medication Refill Request     Name Flomax 0.4 mg   Dose/Frequency Take 1 capsule (0.4 mg total) by mouth daily with dinner  Quantity 90 Capsule  Verified pharmacy   [x]  Verified ordering Provider   [x]  Does patient have enough for the next 3 days?  Yes [] No [x]

## 2022-11-22 RX ORDER — TAMSULOSIN HYDROCHLORIDE 0.4 MG/1
0.4 CAPSULE ORAL
Qty: 90 CAPSULE | Refills: 3 | Status: SHIPPED | OUTPATIENT
Start: 2022-11-22 | End: 2023-01-07

## 2022-12-02 ENCOUNTER — TELEPHONE (OUTPATIENT)
Dept: UROLOGY | Facility: MEDICAL CENTER | Age: 67
End: 2022-12-02

## 2022-12-22 ENCOUNTER — TELEPHONE (OUTPATIENT)
Dept: UROLOGY | Facility: AMBULATORY SURGERY CENTER | Age: 67
End: 2022-12-22

## 2022-12-22 NOTE — TELEPHONE ENCOUNTER
Pt under care of Dr Lauren Lauren    Pt PCP office called and left VM asking if pt needs to have pre op appt with PCP and if so please call office so they can get this scheduled for pt    Call back- [phone]

## 2022-12-26 NOTE — H&P
History and physical examination      I performed history and physical on the patient in the holding area and discussed the proposed procedure with him again in detail  I answered all of his questions described possible risks of the procedure including bleeding infection stress or total urinary incontinence urinary retention need for a catheter or need for additional operative intervention  Metabolic abnormalities were also discussed  The patient reaffirmed verbally the previously signed informed consent  Assessment/Plan      Assessment:  BPH with lower urinary tract obstructive symptoms AUA symptom score 23 on both alpha blockade and 5 alpha reductase inhibition  Erectile dysfunction secondary to type 2 diabetes mellitus     Plan:  TURP     History of Present Illness   HPI:  Maribel Miranda is a 74 y o  male who presents with a longstanding history of obstructive and irritative voiding symptoms  The patient was followed by Dr Skyler Diaz and had an AUA symptom score between 22 and 23 on finasteride and tamsulosin  Cystourethroscopy revealed trilobar hyperplasia of the prostate with visual occlusion of the bladder outlet and a significant amount of intravesical median lobe tissue  The patient was offered a variety of interventions including HoLEP laser prostatectomy TURP UroLift  The patient has chosen TURP understanding risks of retrograde ejaculation erectile dysfunction urinary incontinence bleeding contracture stricture potential need for a Rojas catheter potential urinary retention and potential need for additional operative interventions  The patient expressed understanding and provided both verbal and signed informed consent        Review of Systems   Genitourinary: Positive for difficulty urinating, frequency and urgency  See HPI     Musculoskeletal: Positive for arthralgias and myalgias     All other systems reviewed and are negative         Historical Information        Past Medical History:   Diagnosis Date   • BPH with obstruction/lower urinary tract symptoms     • Elevated PSA     • Erectile dysfunction     • Hypercholesteremia     • Hypertension     • Malignant neoplasm of testis ([address] )     • Nocturia              Past Surgical History:   Procedure Laterality Date   • LAPAROSCOPIC ORCHIECTOMY   1998   • PROSTATE BIOPSY   2012, 2013   • ROTATOR CUFF REPAIR Left        Social History   Social History           Substance and Sexual Activity   Alcohol Use Yes     Comment: Drinks socially        Social History          Substance and Sexual Activity   Drug Use No      Social History      Tobacco Use   Smoking Status Never Smoker   Smokeless Tobacco Never Used      Family History:         Family History   Problem Relation Age of Onset   • Liver cancer Mother     • Colon cancer Mother           Meds/Allergies   all medications and allergies reviewed  No Known Allergies     Objective      Physical Exam  Vitals reviewed  Constitutional:       General: He is not in acute distress  Appearance: Normal appearance  He is not ill-appearing, toxic-appearing or diaphoretic  HENT:      Head: Normocephalic and atraumatic  Nose: Nose normal       Mouth/Throat:      Mouth: Mucous membranes are moist    Eyes:      Extraocular Movements: Extraocular movements intact  Cardiovascular:      Rate and Rhythm: Normal rate and regular rhythm  Heart sounds: Normal heart sounds  Pulmonary:      Effort: Pulmonary effort is normal  No respiratory distress  Breath sounds: No wheezing, rhonchi or rales  Abdominal:      General: Bowel sounds are normal  There is no distension  Palpations: Abdomen is soft  Tenderness: There is no abdominal tenderness  Genitourinary:     Penis: Normal        Comments: Phallus uncircumcised with normally placed patent meatus  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  Musculoskeletal:      Cervical back: Neck supple     Skin: General: Skin is dry  Neurological:      Mental Status: He is alert and oriented to person, place, and time  Psychiatric:         Mood and Affect: Mood normal          Behavior: Behavior normal          Thought Content: Thought content normal          Judgment: Judgment normal             Lab Results: I have personally reviewed pertinent reports  Imaging: I have personally reviewed pertinent reports  and I have personally reviewed pertinent films in PACS  EKG, Pathology, and Other Studies: I have personally reviewed pertinent reports     and I have personally reviewed pertinent films in PACS  VTE Prophylaxis: Sequential compression device [place])

## 2022-12-26 NOTE — DISCHARGE INSTRUCTIONS
Transurethral Prostatectomy   WHAT YOU NEED TO KNOW:   A TURP is surgery to remove part or all of your prostate gland  This surgery is also called transurethral resection of the prostate  After surgery, you may have feelings of urgency and difficulty controlling your urine  You may have pain when you urinate and also a small amount of blood in your urine  You may also have a problem getting an erection and keeping one  DISCHARGE INSTRUCTIONS:   Seek care immediately if:   You have severe abdominal or back pain  You are dizzy or confused  You have abdominal pain, nausea, and vomiting  Your heartbeat is slower than usual     Call your doctor or urologist if:   You urinate little or not at all  You have a fever  You have new or more blood in your urine  You have trouble starting to urinate, or have a weak stream of urine when you urinate  You feel like you have a full bladder, even after you urinate  You often wake up during the night to urinate  You feel pain or pressure in your lower abdomen  Your urine looks cloudy, and smells bad  You have questions or concerns about your condition or care  Medicines: You may need any of the following:  Prescription pain medicine  may be given  Ask your healthcare provider how to take this medicine safely  Some prescription pain medicines contain acetaminophen  Do not take other medicines that contain acetaminophen without talking to your healthcare provider  Too much acetaminophen may cause liver damage  Prescription pain medicine may cause constipation  Ask your healthcare provider how to prevent or treat constipation  Antibiotics  prevent or fight an infection caused by bacteria  Take your medicine as directed  Contact your healthcare provider if you think your medicine is not helping or if you have side effects  Tell him or her if you are allergic to any medicine  Keep a list of the medicines, vitamins, and herbs you take  Include the amounts, and when and why you take them  Bring the list or the pill bottles to follow-up visits  Carry your medicine list with you in case of an emergency  Rojas catheter care:  Keep the bag below your waist  If the bag is too high, urine will flow back into your bladder  This can cause an infection  Do not pull on the catheter  This may cause pain and bleeding, and the catheter may come out  Do not let the catheter tubing kink or twist  A kink or twist will block the flow of urine  Your healthcare provider will tell you how to clean around the catheter if you go home with one You will need to clean the area 2 times a day to prevent infection  Bladder control:  After surgery, you may leak urine and have trouble controlling when you urinate  The following can help decrease or manage urine leakage:  Drink fluids as directed  Fluids may help your kidneys and bladder work properly  Fluids can also decrease your chance for infections  Ask your healthcare provider which fluids are best for you and how much you should drink  Do not have caffeine  Caffeine can cause problems with bladder control and increase your need to urinate  Wear a pad or adult diapers, if needed  These may help to absorb leaking urine and decrease odor  Do pelvic floor muscle exercises  Pelvic floor muscle exercises, also called Kegels, may help improve your bladder control  These exercises are done by tightening and relaxing your pelvic muscles  Ask how to do pelvic floor muscle exercises, and how often to do them  Activity guidelines:  Ask when it is okay for you to return to work and activities, or to have sex  Follow up with your surgeon or urologist as directed: You may need to return to make sure you do not have an infection, or to have your Roajs catheter removed  Write down your questions so you remember to ask them during your visits    © Copyright [employer] 2022 Information is for End User's use only and may not be sold, redistributed or otherwise used for commercial purposes  All illustrations and images included in CareNotes® are the copyrighted property of A D A M , Inc  or [employer]  The above information is an  only  It is not intended as medical advice for individual conditions or treatments  Talk to your doctor, nurse or pharmacist before following any medical regimen to see if it is safe and effective for you  Rojas Catheter Placement and Care   WHAT YOU NEED TO KNOW:   A Rojas catheter is a sterile tube that is inserted into your bladder to drain urine  It is also called an indwelling urinary catheter  DISCHARGE INSTRUCTIONS:   Seek care immediately if:   Your catheter comes out  You suddenly have material that looks like sand in the tubing or drainage bag  No urine is draining into the bag and you have checked the system  You have pain in your hip, back, pelvis, or lower abdomen  You are confused or cannot think clearly  Call your doctor or urologist if:   You have a fever  You have bladder spasms for more than 1 day after the catheter is placed  You see blood in the tubing or drainage bag  You have a rash or itching where the catheter tube is secured to your skin  Urine leaks from or around the catheter, tubing, or drainage bag  The closed drainage system has accidently come open or apart  You see a layer of crystals inside the tubing  You have questions or concerns about your condition or care  Care for your catheter and drainage bag: You can reduce your risk for infection and injury by caring for your catheter and drainage bag properly  Wash your hands often  Wash before and after you touch your catheter, tubing, or drainage bag  Use soap and water  Wear clean disposable gloves when you care for your catheter or disconnect the drainage bag  Wash your hands before you prepare or eat food           Clean your genital area 2 times every day  Clean your catheter area and anal opening after every bowel movement  For men:  Use a soapy cloth to clean the tip of your penis  Start where the catheter enters  Wipe backward making sure to pull back the foreskin  Then use a cloth with clear water in the same direction to clean away the soap  For women:  Use a soapy cloth to clean the area that the catheter enters your body  Make sure to separate your labia and wipe toward the anus  Then use a cloth with clear water and wipe in the same direction  Secure the catheter tube  so you do not pull or move the catheter  This helps prevent pain and bladder spasms  Healthcare providers will show you how to use medical tape or a strap to secure the catheter tube to your body  Keep a closed drainage system  Your catheter should always be attached to the drainage bag to form a closed system  Do not disconnect any part of the closed system unless you need to change the bag  Keep the drainage bag below the level of your waist   This helps stop urine from moving back up the tubing and into your bladder  Do not loop or kink the tubing  This can cause urine to back up and collect in your bladder  Do not let the drainage bag touch or lie on the floor  Empty the drainage bag when needed  The weight of a full drainage bag can be painful  Empty the drainage bag every 3 to 6 hours or when it is ? full  Clean and change the drainage bag as directed  Ask your healthcare provider how often you should change the drainage bag and what cleaning solution to use  Wear disposable gloves when you change the bag  Do not allow the end of the catheter or tubing to touch anything  Clean the ends with an alcohol pad before you reconnect them  What to do if problems develop:   No urine is draining into the bag:      Check for kinks in the tubing and straighten them out  Check the tape or strap used to secure the catheter tube to your skin   Make sure it is not blocking the tube  Make sure you are not sitting or lying on the tubing  Make sure the urine bag is hanging below the level of your waist     Urine leaks from or around the catheter, tubing, or drainage bag:  Check if the closed drainage system has accidently come open or apart  Clean the catheter and tubing ends with a new alcohol pad and reconnect them  Follow up with your doctor or urologist as directed:  Write down your questions so you remember to ask them during your visits  © Copyright [employer] 2022 Information is for End User's use only and may not be sold, redistributed or otherwise used for commercial purposes  All illustrations and images included in CareNotes® are the copyrighted property of A D A M , Inc  or [facility] Lucia Cohen   The above information is an  only  It is not intended as medical advice for individual conditions or treatments  Talk to your doctor, nurse or pharmacist before following any medical regimen to see if it is safe and effective for you

## 2022-12-31 ENCOUNTER — LAB (OUTPATIENT)
Dept: LAB | Facility: MEDICAL CENTER | Age: 67
End: 2022-12-31
Attending: UROLOGY

## 2022-12-31 ENCOUNTER — CLINICAL SUPPORT (OUTPATIENT)
Dept: URGENT CARE | Facility: MEDICAL CENTER | Age: 67
End: 2022-12-31
Attending: UROLOGY

## 2022-12-31 DIAGNOSIS — N13.8 BPH WITH OBSTRUCTION/LOWER URINARY TRACT SYMPTOMS: ICD-10-CM

## 2022-12-31 DIAGNOSIS — N40.1 BPH WITH OBSTRUCTION/LOWER URINARY TRACT SYMPTOMS: ICD-10-CM

## 2022-12-31 LAB
ABO GROUP BLD: NORMAL
ALBUMIN SERPL BCP-MCNC: 3.7 G/DL (ref 3.5–5)
ALP SERPL-CCNC: 72 U/L (ref 46–116)
ALT SERPL W P-5'-P-CCNC: 16 U/L (ref 12–78)
ANION GAP SERPL CALCULATED.3IONS-SCNC: 6 MMOL/L (ref 4–13)
AST SERPL W P-5'-P-CCNC: 13 U/L (ref 5–45)
BASOPHILS # BLD AUTO: 0.06 THOUSANDS/ÂΜL (ref 0–0.1)
BASOPHILS NFR BLD AUTO: 1 % (ref 0–1)
BILIRUB SERPL-MCNC: 0.78 MG/DL (ref 0.2–1)
BLD GP AB SCN SERPL QL: NEGATIVE
BUN SERPL-MCNC: 29 MG/DL (ref 5–25)
CALCIUM SERPL-MCNC: 9.5 MG/DL (ref 8.3–10.1)
CHLORIDE SERPL-SCNC: 111 MMOL/L (ref 96–108)
CO2 SERPL-SCNC: 24 MMOL/L (ref 21–32)
CREAT SERPL-MCNC: 2.1 MG/DL (ref 0.6–1.3)
EOSINOPHIL # BLD AUTO: 0.27 THOUSAND/ÂΜL (ref 0–0.61)
EOSINOPHIL NFR BLD AUTO: 4 % (ref 0–6)
ERYTHROCYTE [DISTWIDTH] IN BLOOD BY AUTOMATED COUNT: 12.5 % (ref 11.6–15.1)
GFR SERPL CREATININE-BSD FRML MDRD: 31 ML/MIN/1.73SQ M
GLUCOSE P FAST SERPL-MCNC: 140 MG/DL (ref 65–99)
HCT VFR BLD AUTO: 35.9 % (ref 36.5–49.3)
HGB BLD-MCNC: 12.1 G/DL (ref 12–17)
IMM GRANULOCYTES # BLD AUTO: 0.03 THOUSAND/UL (ref 0–0.2)
IMM GRANULOCYTES NFR BLD AUTO: 1 % (ref 0–2)
LYMPHOCYTES # BLD AUTO: 1.43 THOUSANDS/ÂΜL (ref 0.6–4.47)
LYMPHOCYTES NFR BLD AUTO: 22 % (ref 14–44)
MCH RBC QN AUTO: 32.3 PG (ref 26.8–34.3)
MCHC RBC AUTO-ENTMCNC: 33.7 G/DL (ref 31.4–37.4)
MCV RBC AUTO: 96 FL (ref 82–98)
MONOCYTES # BLD AUTO: 0.65 THOUSAND/ÂΜL (ref 0.17–1.22)
MONOCYTES NFR BLD AUTO: 10 % (ref 4–12)
NEUTROPHILS # BLD AUTO: 4.09 THOUSANDS/ÂΜL (ref 1.85–7.62)
NEUTS SEG NFR BLD AUTO: 62 % (ref 43–75)
NRBC BLD AUTO-RTO: 0 /100 WBCS
PLATELET # BLD AUTO: 219 THOUSANDS/UL (ref 149–390)
PMV BLD AUTO: 9.6 FL (ref 8.9–12.7)
POTASSIUM SERPL-SCNC: 4.8 MMOL/L (ref 3.5–5.3)
PROT SERPL-MCNC: 8.2 G/DL (ref 6.4–8.4)
RBC # BLD AUTO: 3.75 MILLION/UL (ref 3.88–5.62)
RH BLD: POSITIVE
SODIUM SERPL-SCNC: 141 MMOL/L (ref 135–147)
SPECIMEN EXPIRATION DATE: NORMAL
WBC # BLD AUTO: 6.53 THOUSAND/UL (ref 4.31–10.16)

## 2023-01-01 LAB
ATRIAL RATE: 93 BPM
P AXIS: 54 DEGREES
PR INTERVAL: 132 MS
QRS AXIS: 4 DEGREES
QRSD INTERVAL: 80 MS
QT INTERVAL: 344 MS
QTC INTERVAL: 427 MS
T WAVE AXIS: 50 DEGREES
VENTRICULAR RATE: 93 BPM

## 2023-01-02 DIAGNOSIS — R82.71 BACTERIURIA: Primary | ICD-10-CM

## 2023-01-02 LAB — BACTERIA UR CULT: ABNORMAL

## 2023-01-02 RX ORDER — SULFAMETHOXAZOLE AND TRIMETHOPRIM 800; 160 MG/1; MG/1
1 TABLET ORAL EVERY 12 HOURS SCHEDULED
Qty: 14 TABLET | Refills: 0 | Status: SHIPPED | OUTPATIENT
Start: 2023-01-02 | End: 2023-01-03 | Stop reason: SDUPTHER

## 2023-01-02 NOTE — RESULT ENCOUNTER NOTE
Please call the patient regarding his abnormal result  On 1/2 I ordered trimethoprim sulfa DS 1 twice daily    Please make sure that the patient has started this as he is having surgery Friday

## 2023-01-03 ENCOUNTER — TELEPHONE (OUTPATIENT)
Dept: UROLOGY | Facility: MEDICAL CENTER | Age: 68
End: 2023-01-03

## 2023-01-03 DIAGNOSIS — R82.71 BACTERIURIA: ICD-10-CM

## 2023-01-03 RX ORDER — SULFAMETHOXAZOLE AND TRIMETHOPRIM 800; 160 MG/1; MG/1
1 TABLET ORAL EVERY 12 HOURS SCHEDULED
Qty: 14 TABLET | Refills: 0 | Status: SHIPPED | OUTPATIENT
Start: 2023-01-03 | End: 2023-01-07

## 2023-01-03 NOTE — TELEPHONE ENCOUNTER
----- Message from Mary Cid MD sent at 1/2/2023  9:03 AM EST -----  Please call the patient regarding his abnormal result  On 1/2 I ordered trimethoprim sulfa DS 1 twice daily    Please make sure that the patient has started this as he is having surgery Friday

## 2023-01-03 NOTE — TELEPHONE ENCOUNTER
Patient called wanting the office to know that he had went and  his antibiotic from the pharmacy and he started taking the antibiotic

## 2023-01-03 NOTE — TELEPHONE ENCOUNTER
Called patient - LMOM that Dr Mcdowell Blood sent in antibiotic to his pharmacy for a bladder infection  He is told to start this immediately  He is asked to call if there are any further questions or concerns

## 2023-01-03 NOTE — PRE-PROCEDURE INSTRUCTIONS
Pre-Surgery Instructions:   Medication Instructions   • ASPIRIN 81 PO Pt will check with MD for instructions   • finasteride (PROSCAR) 5 mg tablet Take day of surgery  • ibuprofen (MOTRIN) 600 mg tablet Stop taking 3 days prior to surgery  • MEGARED OMEGA-3 KRILL OIL PO Stop taking 3 days prior to surgery  • simvastatin (ZOCOR) 20 mg tablet Take night before surgery   • sulfamethoxazole-trimethoprim (BACTRIM DS) 800-160 mg per tablet Take day of surgery  • tamsulosin (FLOMAX) 0 4 mg Take night before surgery   • zolpidem (AMBIEN) 10 mg tablet Take night before surgery   Pt  Verbalized understanding of current visitor restrictions  Pt  Verbalized an understanding of all instructions reviewed and offers no concerns at this time  Instructed to avoid all NSAIDs and OTC Vit/Supp from now until after surgery per anesthesia guidelines   Tylenol ok prn   Pre-op medication, and showering instructions with antibacteral soap reviewed

## 2023-01-04 ENCOUNTER — TELEPHONE (OUTPATIENT)
Dept: UROLOGY | Facility: MEDICAL CENTER | Age: 68
End: 2023-01-04

## 2023-01-04 ENCOUNTER — APPOINTMENT (OUTPATIENT)
Dept: PREADMISSION TESTING | Facility: HOSPITAL | Age: 68
End: 2023-01-04

## 2023-01-04 NOTE — TELEPHONE ENCOUNTER
Casandra Smith from [facility] office , Dr Allan Teran wants to speak with Margot Numbers can be reached at [phone]

## 2023-01-05 ENCOUNTER — TELEPHONE (OUTPATIENT)
Dept: OTHER | Facility: OTHER | Age: 68
End: 2023-01-05

## 2023-01-05 ENCOUNTER — ANESTHESIA EVENT (OUTPATIENT)
Dept: PERIOP | Facility: HOSPITAL | Age: 68
End: 2023-01-05

## 2023-01-05 NOTE — TELEPHONE ENCOUNTER
Call from Margaret Lu, patients wife, asking if she needs to stay with him for 24 hours after the surgery even while he is in the hospital? She is also asking where she should be parking for this procedure  Please return her call

## 2023-01-06 ENCOUNTER — ANESTHESIA (OUTPATIENT)
Dept: PERIOP | Facility: HOSPITAL | Age: 68
End: 2023-01-06

## 2023-01-06 ENCOUNTER — HOSPITAL ENCOUNTER (OUTPATIENT)
Facility: HOSPITAL | Age: 68
Setting detail: OUTPATIENT SURGERY
Discharge: HOME | End: 2023-01-07
Attending: UROLOGY | Admitting: UROLOGY

## 2023-01-06 DIAGNOSIS — N13.8 BPH WITH OBSTRUCTION/LOWER URINARY TRACT SYMPTOMS: ICD-10-CM

## 2023-01-06 DIAGNOSIS — N40.1 BPH WITH OBSTRUCTION/LOWER URINARY TRACT SYMPTOMS: ICD-10-CM

## 2023-01-06 DIAGNOSIS — N40.0 ENLARGED PROSTATE WITHOUT LOWER URINARY TRACT SYMPTOMS (LUTS): ICD-10-CM

## 2023-01-06 LAB
ABO GROUP BLD: NORMAL
FLUAV RNA RESP QL NAA+PROBE: NEGATIVE
FLUBV RNA RESP QL NAA+PROBE: NEGATIVE
GLUCOSE SERPL-MCNC: 135 MG/DL (ref 65–140)
GLUCOSE SERPL-MCNC: 147 MG/DL (ref 65–140)
GLUCOSE SERPL-MCNC: 158 MG/DL (ref 65–140)
GLUCOSE SERPL-MCNC: 185 MG/DL (ref 65–140)
RH BLD: POSITIVE
RSV RNA RESP QL NAA+PROBE: NEGATIVE
SARS-COV-2 RNA RESP QL NAA+PROBE: NEGATIVE

## 2023-01-06 RX ORDER — SULFAMETHOXAZOLE AND TRIMETHOPRIM 800; 160 MG/1; MG/1
1 TABLET ORAL DAILY
Qty: 7 TABLET | Refills: 0 | Status: SHIPPED | OUTPATIENT
Start: 2023-01-07 | End: 2023-01-14

## 2023-01-06 RX ORDER — OXYBUTYNIN CHLORIDE 10 MG/1
10 TABLET, EXTENDED RELEASE ORAL DAILY
Status: DISCONTINUED | OUTPATIENT
Start: 2023-01-06 | End: 2023-01-07 | Stop reason: HOSPADM

## 2023-01-06 RX ORDER — SORBITOL 30 G/1000ML
IRRIGANT IRRIGATION AS NEEDED
Status: DISCONTINUED | OUTPATIENT
Start: 2023-01-06 | End: 2023-01-06 | Stop reason: HOSPADM

## 2023-01-06 RX ORDER — DEXTROSE, SODIUM CHLORIDE, AND POTASSIUM CHLORIDE 5; .45; .15 G/100ML; G/100ML; G/100ML
75 INJECTION INTRAVENOUS CONTINUOUS
Status: DISCONTINUED | OUTPATIENT
Start: 2023-01-06 | End: 2023-01-07 | Stop reason: HOSPADM

## 2023-01-06 RX ORDER — FENTANYL CITRATE 50 UG/ML
INJECTION, SOLUTION INTRAMUSCULAR; INTRAVENOUS AS NEEDED
Status: DISCONTINUED | OUTPATIENT
Start: 2023-01-06 | End: 2023-01-06

## 2023-01-06 RX ORDER — HYDROCODONE BITARTRATE AND ACETAMINOPHEN 5; 325 MG/1; MG/1
1 TABLET ORAL EVERY 6 HOURS PRN
Status: DISCONTINUED | OUTPATIENT
Start: 2023-01-06 | End: 2023-01-07 | Stop reason: HOSPADM

## 2023-01-06 RX ORDER — ZOLPIDEM TARTRATE 5 MG/1
5 TABLET ORAL
Status: DISCONTINUED | OUTPATIENT
Start: 2023-01-06 | End: 2023-01-07 | Stop reason: HOSPADM

## 2023-01-06 RX ORDER — ONDANSETRON 2 MG/ML
4 INJECTION INTRAMUSCULAR; INTRAVENOUS EVERY 6 HOURS PRN
Status: DISCONTINUED | OUTPATIENT
Start: 2023-01-06 | End: 2023-01-06 | Stop reason: HOSPADM

## 2023-01-06 RX ORDER — CEFAZOLIN SODIUM 2 G/50ML
2000 SOLUTION INTRAVENOUS ONCE
Status: COMPLETED | OUTPATIENT
Start: 2023-01-06 | End: 2023-01-06

## 2023-01-06 RX ORDER — SODIUM CHLORIDE 9 MG/ML
125 INJECTION, SOLUTION INTRAVENOUS CONTINUOUS
Status: DISCONTINUED | OUTPATIENT
Start: 2023-01-06 | End: 2023-01-06

## 2023-01-06 RX ORDER — OXYBUTYNIN CHLORIDE 10 MG/1
10 TABLET, EXTENDED RELEASE ORAL
Qty: 4 TABLET | Refills: 0 | Status: SHIPPED | OUTPATIENT
Start: 2023-01-07

## 2023-01-06 RX ORDER — FENTANYL CITRATE 50 UG/ML
50 INJECTION, SOLUTION INTRAMUSCULAR; INTRAVENOUS
Status: DISCONTINUED | OUTPATIENT
Start: 2023-01-06 | End: 2023-01-06 | Stop reason: HOSPADM

## 2023-01-06 RX ORDER — ONDANSETRON 2 MG/ML
INJECTION INTRAMUSCULAR; INTRAVENOUS AS NEEDED
Status: DISCONTINUED | OUTPATIENT
Start: 2023-01-06 | End: 2023-01-06

## 2023-01-06 RX ORDER — SULFAMETHOXAZOLE AND TRIMETHOPRIM 800; 160 MG/1; MG/1
1 TABLET ORAL DAILY
Status: DISCONTINUED | OUTPATIENT
Start: 2023-01-07 | End: 2023-01-07 | Stop reason: HOSPADM

## 2023-01-06 RX ORDER — FINASTERIDE 5 MG/1
5 TABLET, FILM COATED ORAL EVERY MORNING
Status: DISCONTINUED | OUTPATIENT
Start: 2023-01-07 | End: 2023-01-07 | Stop reason: HOSPADM

## 2023-01-06 RX ORDER — PROPOFOL 10 MG/ML
INJECTION, EMULSION INTRAVENOUS AS NEEDED
Status: DISCONTINUED | OUTPATIENT
Start: 2023-01-06 | End: 2023-01-06

## 2023-01-06 RX ORDER — MAGNESIUM HYDROXIDE 1200 MG/15ML
LIQUID ORAL AS NEEDED
Status: DISCONTINUED | OUTPATIENT
Start: 2023-01-06 | End: 2023-01-06 | Stop reason: HOSPADM

## 2023-01-06 RX ORDER — PRAVASTATIN SODIUM 40 MG
40 TABLET ORAL
Status: DISCONTINUED | OUTPATIENT
Start: 2023-01-06 | End: 2023-01-07 | Stop reason: HOSPADM

## 2023-01-06 RX ORDER — FUROSEMIDE 10 MG/ML
INJECTION INTRAMUSCULAR; INTRAVENOUS AS NEEDED
Status: DISCONTINUED | OUTPATIENT
Start: 2023-01-06 | End: 2023-01-06

## 2023-01-06 RX ORDER — MIDAZOLAM HYDROCHLORIDE 2 MG/2ML
INJECTION, SOLUTION INTRAMUSCULAR; INTRAVENOUS AS NEEDED
Status: DISCONTINUED | OUTPATIENT
Start: 2023-01-06 | End: 2023-01-06

## 2023-01-06 RX ADMIN — SODIUM CHLORIDE 125 ML/HR: 0.9 INJECTION, SOLUTION INTRAVENOUS at 08:20

## 2023-01-06 RX ADMIN — CEFAZOLIN SODIUM 2000 MG: 2 SOLUTION INTRAVENOUS at 09:54

## 2023-01-06 RX ADMIN — MIDAZOLAM 2 MG: 1 INJECTION INTRAMUSCULAR; INTRAVENOUS at 09:59

## 2023-01-06 RX ADMIN — OXYBUTYNIN CHLORIDE 10 MG: 10 TABLET, EXTENDED RELEASE ORAL at 16:15

## 2023-01-06 RX ADMIN — FENTANYL CITRATE 50 MCG: 50 INJECTION INTRAMUSCULAR; INTRAVENOUS at 10:30

## 2023-01-06 RX ADMIN — PROPOFOL 200 MG: 10 INJECTION, EMULSION INTRAVENOUS at 10:03

## 2023-01-06 RX ADMIN — FUROSEMIDE 20 MG: 10 INJECTION, SOLUTION INTRAVENOUS at 10:56

## 2023-01-06 RX ADMIN — ZOLPIDEM TARTRATE 5 MG: 5 TABLET, COATED ORAL at 21:35

## 2023-01-06 RX ADMIN — PRAVASTATIN SODIUM 40 MG: 40 TABLET ORAL at 16:15

## 2023-01-06 RX ADMIN — DEXTROSE, SODIUM CHLORIDE, AND POTASSIUM CHLORIDE 75 ML/HR: 5; .45; .15 INJECTION INTRAVENOUS at 16:08

## 2023-01-06 RX ADMIN — FENTANYL CITRATE 50 MCG: 50 INJECTION INTRAMUSCULAR; INTRAVENOUS at 10:39

## 2023-01-06 RX ADMIN — LIDOCAINE HYDROCHLORIDE 100 MG: 20 INJECTION INTRAVENOUS at 10:03

## 2023-01-06 RX ADMIN — SODIUM CHLORIDE: 0.9 INJECTION, SOLUTION INTRAVENOUS at 10:58

## 2023-01-06 RX ADMIN — ONDANSETRON 4 MG: 2 INJECTION INTRAMUSCULAR; INTRAVENOUS at 10:03

## 2023-01-06 NOTE — ANESTHESIA PREPROCEDURE EVALUATION
Procedure:  (TURP) (Urethra)    Relevant Problems   CARDIO   (+) Hypercholesteremia   (+) Hypertension      ENDO   (+) Type 2 diabetes mellitus without complication, without long-term current use of insulin (HCC)      /RENAL   (+) BPH with obstruction/lower urinary tract symptoms      Other   (+) Enlarged prostate without lower urinary tract symptoms (luts)        Physical Exam    Airway    Mallampati score: II  TM Distance: >3 FB  Neck ROM: full     Dental   No notable dental hx     Cardiovascular  Rhythm: regular, Rate: normal, Cardiovascular exam normal    Pulmonary  Pulmonary exam normal Breath sounds clear to auscultation,     Other Findings        Anesthesia Plan  ASA Score- 2     Anesthesia Type- general with ASA Monitors  Additional Monitors:   Airway Plan:           Plan Factors-    Chart reviewed  Existing labs reviewed  Patient summary reviewed  Patient is not a current smoker  Patient not instructed to abstain from smoking on day of procedure  Patient did not smoke on day of surgery  There is medical exclusion for perioperative obstructive sleep apnea risk education  Induction- intravenous  Postoperative Plan-     Informed Consent- Anesthetic plan and risks discussed with patient and spouse

## 2023-01-06 NOTE — OP NOTE
OPERATIVE REPORT  PATIENT NAME: Deangelo Scott    :  1955  MRN: [id number]  Pt Location: [facility] OR ROOM 06    SURGERY DATE: 2023    Surgeon(s) and Role:     * Sheryle Chambers, MD - Primary    Preop Diagnosis:  BPH with obstruction/lower urinary tract symptoms [N40 1, N13 8]-urinary retention    Post-Op Diagnosis Codes:     * BPH with obstruction/lower urinary tract symptoms [N40 1, N13 8]-urinary retention    Procedure(s) (LRB):  (TURP) (N/A)    Specimen(s):  ID Type Source Tests Collected by Time Destination   1 : Prostate Chips Tissue Prostate TISSUE EXAM Sheryle Chambers, MD 2023 1143        Estimated Blood Loss:   Minimal    Drains:  Urethral Catheter Latex; Three way 24 Fr  (Active)   Number of days: 0       Continuous Bladder Irrigation Three-way (Active)   Number of days: 0       Anesthesia Type:   General    Operative Indications:  BPH with obstruction/lower urinary tract symptoms [N40 1, N13 8]  Failure of voiding trials on multiple occasions/urinary retention    Operative Findings:   See operative note below    Procedure and Technique:  I reviewed the patient's history and physical in the holding area and agree with the findings, discussed risk and complications of TURP, discussed TURP step-by-step and answered all patient questions with the patient reaffirming previously signed informed consent verbally  The patient was taken to the operating room placed on the operating table in the dorsolithotomy position and general anesthesia was then induced with the genitalia being prepped and draped in usual sterile fashion after appropriate timeout  Dilation of the urethral meatus took place from an 25 [place] Christine to a 28 Western Christine size inclusively using Clabe Kallman male metal sounds    A 28 [place] obturator and sheath was placed per urethra into the urinary bladder and cystourethroscopy revealed a normal bladder mucosa with moderate trabeculation no intrinsic lesions no extrinsic mass compression normal ureteral orifice ease and configuration bilaterally with clear reflux bilaterally  The remainder of the urethra was within normal limits without stricture or lesion except for the prostatic urethra which demonstrated by lobar enlargement of the lateral lobes of the prostate with visual occlusion to the bladder outlet  There is a slight amount of median lobe enlargement as well  After completion of cystourethroscopy the resectoscope with a 28 loop was used to resect the prostate using cutting current from the bladder neck to the verumontanum stepwise to the surgical capsular fibers of the prostate  After complete resection of the prostatic adenoma within these boundaries the [facility] evacuator was used to evacuate all prostatic specimen from the bladder and urethra  The coagulation current using the rollerball was used to fulgurate any observe bleeding points within the prostatic fossa  Examination of the urinary bladder prostatic fossa and ureteral orifice ease after TURP revealed no evidence of bladder or prostatic perforation no active bleeding and normal nontraumatized ureteral orifice ease bilaterally  With the E flux from the bladder being totally clear the resectoscope was removed from the patient's lower urinary tract and a three-way 24 [place] Rojas catheter was inserted per urethra with 50 cc placed in the Rojas balloon and mild leg traction applied with continuous normal saline solution bladder irrigation titrated to light pink to clear urine  The patient's abdomen was palpated as benign at the end of the procedure and there were no complications  The patient was given 20 mg of IV Lasix in the midpoint of the procedure  The patient was then transferred to the PACU in good condition having tolerated the procedure well      Ultimate plan will be to keep the patient in a no charge overnight bed and if his urine is adequately clear he can be discharged tomorrow and return to the office on 110 or 111 for voiding trial   Discussion of pathology report will take place at that time as well   I was present for the entire procedure and I was present for all critical portions of the procedure    Patient Disposition:  PACU         SIGNATURE: Lula Mora MD  DATE: January 6, 2023  TIME: 11:59 AM

## 2023-01-06 NOTE — INTERVAL H&P NOTE
H&P reviewed  After examining the patient I find no changes in the patients condition since the H&P had been written      Vitals:    01/06/23 0719   BP: 158/76   Pulse: 105   Resp: 16   Temp: 98 3 °F (36 8 °C)   SpO2: 97%

## 2023-01-06 NOTE — ANESTHESIA POSTPROCEDURE EVALUATION
Post-Op Assessment Note    CV Status:  Stable    Pain management: adequate     Mental Status:  Alert and awake   Hydration Status:  Euvolemic   PONV Controlled:  Controlled   Airway Patency:  Patent   Two or more mitigation strategies used for obstructive sleep apnea   Post Op Vitals Reviewed: Yes      Staff: Anesthesiologist         No notable events documented      BP      Temp      Pulse     Resp      SpO2      /63 (BP Location: Right arm)   Pulse 91   Temp 97 7 °F (36 5 °C) (Oral)   Resp 18   Ht 5' 7" (1 702 m)   Wt 84 1 kg (185 lb 6 5 oz)   SpO2 95%   BMI 29 04 kg/m²

## 2023-01-07 VITALS
BODY MASS INDEX: 29.1 KG/M2 | TEMPERATURE: 98.8 F | RESPIRATION RATE: 17 BRPM | HEIGHT: 67 IN | WEIGHT: 185.41 LBS | SYSTOLIC BLOOD PRESSURE: 120 MMHG | DIASTOLIC BLOOD PRESSURE: 67 MMHG | HEART RATE: 85 BPM | OXYGEN SATURATION: 97 %

## 2023-01-07 RX ADMIN — SULFAMETHOXAZOLE AND TRIMETHOPRIM 1 TABLET: 800; 160 TABLET ORAL at 09:05

## 2023-01-07 RX ADMIN — DEXTROSE, SODIUM CHLORIDE, AND POTASSIUM CHLORIDE 75 ML/HR: 5; .45; .15 INJECTION INTRAVENOUS at 06:31

## 2023-01-07 RX ADMIN — OXYBUTYNIN CHLORIDE 10 MG: 10 TABLET, EXTENDED RELEASE ORAL at 09:05

## 2023-01-07 RX ADMIN — FINASTERIDE 5 MG: 5 TABLET, FILM COATED ORAL at 09:05

## 2023-01-07 NOTE — NURSING NOTE
Pt IV removed, avs reviewed with pt  And wife  All pt belongings left with pt and wife off the unit   Pt left ambulating off of unit

## 2023-01-07 NOTE — PLAN OF CARE
Problem: PAIN - ADULT  Goal: Verbalizes/displays adequate comfort level or baseline comfort level  Description: Interventions:  - Encourage patient to monitor pain and request assistance  - Assess pain using appropriate pain scale  - Administer analgesics based on type and severity of pain and evaluate response  - Implement non-pharmacological measures as appropriate and evaluate response  - Consider cultural and social influences on pain and pain management  - Notify physician/advanced practitioner if interventions unsuccessful or patient reports new pain  Outcome: Progressing     Problem: INFECTION - ADULT  Goal: Absence or prevention of progression during hospitalization  Description: INTERVENTIONS:  - Assess and monitor for signs and symptoms of infection  - Monitor lab/diagnostic results  - Monitor all insertion sites, i e  indwelling lines, tubes, and drains  - Monitor endotracheal if appropriate and nasal secretions for changes in amount and color  - [place] appropriate cooling/warming therapies per order  - Administer medications as ordered  - Instruct and encourage patient and family to use good hand hygiene technique  - Identify and instruct in appropriate isolation precautions for identified infection/condition  Outcome: Progressing  Goal: Absence of fever/infection during neutropenic period  Description: INTERVENTIONS:  - Monitor WBC    Outcome: Progressing     Problem: SAFETY ADULT  Goal: Patient will remain free of falls  Description: INTERVENTIONS:  - Educate patient/family on patient safety including physical limitations  - Instruct patient to call for assistance with activity   - Consult OT/PT to assist with strengthening/mobility   - Keep Call bell within reach  - Keep bed low and locked with side rails adjusted as appropriate  - Keep care items and personal belongings within reach  - Initiate and maintain comfort rounds  - Make Fall Risk Sign visible to staff  - Offer Toileting every 2 Hours, in advance of need  - Initiate/Maintain bed alarm  - Obtain necessary fall risk management equipment: call bell in reach and nonskid footwear  - Apply yellow socks and bracelet for high fall risk patients  - Consider moving patient to room near nurses station  Outcome: Progressing  Goal: Maintain or return to baseline ADL function  Description: INTERVENTIONS:  -  Assess patient's ability to carry out ADLs; assess patient's baseline for ADL function and identify physical deficits which impact ability to perform ADLs (bathing, care of mouth/teeth, toileting, grooming, dressing, etc )  - Assess/evaluate cause of self-care deficits   - Assess range of motion  - Assess patient's mobility; develop plan if impaired  - Assess patient's need for assistive devices and provide as appropriate  - Encourage maximum independence but intervene and supervise when necessary  - Involve family in performance of ADLs  - Assess for home care needs following discharge   - Consider OT consult to assist with ADL evaluation and planning for discharge  - Provide patient education as appropriate  Outcome: Progressing  Goal: Maintains/Returns to pre admission functional level  Description: INTERVENTIONS:  - Perform BMAT or MOVE assessment daily    - Set and communicate daily mobility goal to care team and patient/family/caregiver  - Collaborate with rehabilitation services on mobility goals if consulted  - Perform Range of Motion 3 times a day  - Reposition patient every 2 hours    - Dangle patient 3 times a day  - Stand patient 3 times a day  - Ambulate patient 3 times a day  - Out of bed to chair 3 times a day   - Out of bed for meals 3 times a day  - Out of bed for toileting  - Record patient progress and toleration of activity level   Outcome: Progressing     Problem: DISCHARGE PLANNING  Goal: Discharge to home or other facility with appropriate resources  Description: INTERVENTIONS:  - Identify barriers to discharge w/patient and caregiver  - Arrange for needed discharge resources and transportation as appropriate  - Identify discharge learning needs (meds, wound care, etc )  - Arrange for interpretive services to assist at discharge as needed  - Refer to Case Management Department for coordinating discharge planning if the patient needs post-hospital services based on physician/advanced practitioner order or complex needs related to functional status, cognitive ability, or social support system  Outcome: Progressing     Problem: Knowledge Deficit  Goal: Patient/family/caregiver demonstrates understanding of disease process, treatment plan, medications, and discharge instructions  Description: Complete learning assessment and assess knowledge base    Interventions:  - Provide teaching at level of understanding  - Provide teaching via preferred learning methods  Outcome: Progressing     Problem: GENITOURINARY - ADULT  Goal: Maintains or returns to baseline urinary function  Description: INTERVENTIONS:  - Assess urinary function  - Encourage oral fluids to ensure adequate hydration if ordered  - Administer IV fluids as ordered to ensure adequate hydration  - Administer ordered medications as needed  - Offer frequent toileting  - Follow urinary retention protocol if ordered  Outcome: Progressing  Goal: Absence of urinary retention  Description: INTERVENTIONS:  - Assess patient’s ability to void and empty bladder  - Monitor I/O  - Bladder scan as needed  - Discuss with physician/AP medications to alleviate retention as needed  - Discuss catheterization for long term situations as appropriate  Outcome: Progressing  Goal: Urinary catheter remains patent  Description: INTERVENTIONS:  - Assess patency of urinary catheter  - If patient has a chronic diaz, consider changing catheter if non-functioning  - Follow guidelines for intermittent irrigation of non-functioning urinary catheter  Outcome: Progressing

## 2023-01-07 NOTE — PLAN OF CARE
Problem: PAIN - ADULT  Goal: Verbalizes/displays adequate comfort level or baseline comfort level  Description: Interventions:  - Encourage patient to monitor pain and request assistance  - Assess pain using appropriate pain scale  - Administer analgesics based on type and severity of pain and evaluate response  - Implement non-pharmacological measures as appropriate and evaluate response  - Consider cultural and social influences on pain and pain management  - Notify physician/advanced practitioner if interventions unsuccessful or patient reports new pain  Outcome: Progressing     Problem: INFECTION - ADULT  Goal: Absence or prevention of progression during hospitalization  Description: INTERVENTIONS:  - Assess and monitor for signs and symptoms of infection  - Monitor lab/diagnostic results  - Monitor all insertion sites, i e  indwelling lines, tubes, and drains  - Monitor endotracheal if appropriate and nasal secretions for changes in amount and color  - [place] appropriate cooling/warming therapies per order  - Administer medications as ordered  - Instruct and encourage patient and family to use good hand hygiene technique  - Identify and instruct in appropriate isolation precautions for identified infection/condition  Outcome: Progressing  Goal: Absence of fever/infection during neutropenic period  Description: INTERVENTIONS:  - Monitor WBC    Outcome: Progressing     Problem: DISCHARGE PLANNING  Goal: Discharge to home or other facility with appropriate resources  Description: INTERVENTIONS:  - Identify barriers to discharge w/patient and caregiver  - Arrange for needed discharge resources and transportation as appropriate  - Identify discharge learning needs (meds, wound care, etc )  - Arrange for interpretive services to assist at discharge as needed  - Refer to Case Management Department for coordinating discharge planning if the patient needs post-hospital services based on physician/advanced practitioner order or complex needs related to functional status, cognitive ability, or social support system  Outcome: Progressing     Problem: Knowledge Deficit  Goal: Patient/family/caregiver demonstrates understanding of disease process, treatment plan, medications, and discharge instructions  Description: Complete learning assessment and assess knowledge base    Interventions:  - Provide teaching at level of understanding  - Provide teaching via preferred learning methods  Outcome: Progressing     Problem: GENITOURINARY - ADULT  Goal: Maintains or returns to baseline urinary function  Description: INTERVENTIONS:  - Assess urinary function  - Encourage oral fluids to ensure adequate hydration if ordered  - Administer IV fluids as ordered to ensure adequate hydration  - Administer ordered medications as needed  - Offer frequent toileting  - Follow urinary retention protocol if ordered  Outcome: Progressing  Goal: Absence of urinary retention  Description: INTERVENTIONS:  - Assess patient’s ability to void and empty bladder  - Monitor I/O  - Bladder scan as needed  - Discuss with physician/AP medications to alleviate retention as needed  - Discuss catheterization for long term situations as appropriate  Outcome: Progressing  Goal: Urinary catheter remains patent  Description: INTERVENTIONS:  - Assess patency of urinary catheter  - If patient has a chronic diaz, consider changing catheter if non-functioning  - Follow guidelines for intermittent irrigation of non-functioning urinary catheter  Outcome: Progressing

## 2023-01-07 NOTE — DISCHARGE SUMMARY
DISCHARGE SUMMARY    Patient Name: Tiffanie Brambila  Patient MRN: [id number]  Admitting Provider: Rosey Jenkins MD  Discharging Provider: Rosey Jenkins MD  Primary Care Physician at Discharge: Sowmya Arndt, [address]   Admission Date: 1/6/2023       Discharge Date: 01/07/23      Admission Diagnosis   BPH with obstruction/lower urinary tract symptoms [N40 1, N13 8]    Discharge Diagnoses  Patient Active Problem List   Diagnosis   • Enlarged prostate without lower urinary tract symptoms (luts)   • Type 2 diabetes mellitus without complication, without long-term current use of insulin ([facility] [address] )   • Other male erectile dysfunction   • BPH with obstruction/lower urinary tract symptoms   • Hypertension   • [address] [facility] Course  Patient known to group for BPH/ with LUTs and urinary obstruction  Unfortunately, patient failed medical management  After explanation of Risks vs  Benefits and potential complications; patient was agreeable and furnished informed consent for transurethral resection of the prostate  Refer to operative report for details  There were no complications  Patient was admitted for overnight observation and continuous bladder irrigation  Patient remained afebrile, HD stable and pain controlled  Breakfast was tolerated  CBI was clamped  Urine was clear to mild without clots  Patient therefore deemed  stable for discharge with Rojas catheter  Patient verbalized understanding of all discharge instructions including: Medications, diet, activity limitations/restrictions, signs and symptoms to report, basic Rojas catheter care and follow-up    Patient will present to the office  1/10 for trial of void/Rojas catheter removal     Medications  Current Discharge Medication List      CONTINUE these medications which have NOT CHANGED    Details   ASPIRIN 81 PO Take 81 mg by mouth daily      ibuprofen (MOTRIN) 600 mg tablet       MEGARED OMEGA-3 KRILL OIL PO Take by mouth daily      simvastatin (ZOCOR) 20 mg tablet Take 20 mg by mouth every morning      zolpidem (AMBIEN) 10 mg tablet Take 10 mg by mouth daily at bedtime      hydrochlorothiazide (HYDRODIURIL) 25 mg tablet Take 25 mg by mouth daily      metFORMIN (GLUCOPHAGE) 500 mg tablet Take 500 mg by mouth 2 (two) times a day      quinapril (ACCUPRIL) 40 MG tablet Take 40 mg by mouth every morning           Current Discharge Medication List      START taking these medications    Details   oxybutynin (DITROPAN-XL) 10 MG 24 hr tablet Take 1 tablet (10 mg total) by mouth daily at bedtime Do not take day of catheter removal Do not start before January 7, 2023    Qty: 4 tablet, Refills: 0    Associated Diagnoses: Enlarged prostate without lower urinary tract symptoms (luts)               Allergies  No Known Allergies    Outpatient Follow-Up  Future Appointments   Date Time Provider Yulisa Sheriff   1/10/2023  9:00 AM UROLOGY NURSE DANIEL LOWRY [facility]   1/10/2023  3:00 PM UROLOGY NURSE DANIEL LOWRY [facility]   1/26/2023 10:30 AM CASANDRA Monroy [facility]       Active Issues Requiring Follow-Up  Rojas removal    Test Results Pending at Discharge  Pending Labs     Order Current Status    Tissue Exam In process            Discharge Disposition  Home     Treatments   Rojas catheter     Consults   none    Procedures   CBI--clamped prior to discharge    Operative Procedures Performed  Procedure(s):  (TURP)    Pertinent Test Results   Lab Results   Component Value Date    WBC 6 53 12/31/2022    HGB 12 1 12/31/2022    HCT 35 9 (L) 12/31/2022    MCV 96 12/31/2022     12/31/2022     Lab Results   Component Value Date    SODIUM 141 12/31/2022    K 4 8 12/31/2022     (H) 12/31/2022    CO2 24 12/31/2022    BUN 29 (H) 12/31/2022    CREATININE 2 10 (H) 12/31/2022    CALCIUM 9 5 12/31/2022       Pathology Report Pending    Physical Exam at Discharge  Condition of Patient on Discharge: good  BP 120/67   Pulse 85   Temp 98 8 °F (37 1 °C)   Resp 17   Ht 5' 7" (1 702 m)   Wt 84 1 kg (185 lb 6 5 oz)   SpO2 97%   BMI 29 04 kg/m²   General appearance: alert and oriented, in no acute distress, appears stated age, cooperative and no distress  Head: Normocephalic, without obvious abnormality, atraumatic  Neck: no adenopathy, no carotid bruit, no JVD, supple, symmetrical, trachea midline and thyroid not enlarged, symmetric, no tenderness/mass/nodules  Lungs: diminished breath sounds  Heart: regular rate and rhythm, S1, S2 normal, no murmur, click, rub or gallop  Abdomen: soft, non-tender; bowel sounds normal; no masses,  no organomegaly  Extremities: extremities normal, warm and well-perfused; no cyanosis, clubbing, or edema  Pulses: 2+ and symmetric  Neurologic: Grossly normal  Three-way Rojas--CBI--clamped at 0600  Patent for yellow to clear peach urine  No clots  I/O last 3 completed shifts: In: 5012 [I V :1715; IV Piggyback:50]  Out: 1925 [Urine:1925]  No intake/output data recorded              CASANDRA Andrea

## 2023-01-10 ENCOUNTER — TELEPHONE (OUTPATIENT)
Dept: UROLOGY | Facility: MEDICAL CENTER | Age: 68
End: 2023-01-10

## 2023-01-10 ENCOUNTER — PROCEDURE VISIT (OUTPATIENT)
Dept: UROLOGY | Facility: MEDICAL CENTER | Age: 68
End: 2023-01-10

## 2023-01-10 VITALS
HEIGHT: 67 IN | BODY MASS INDEX: 28.53 KG/M2 | DIASTOLIC BLOOD PRESSURE: 98 MMHG | WEIGHT: 181.8 LBS | SYSTOLIC BLOOD PRESSURE: 162 MMHG | HEART RATE: 100 BPM

## 2023-01-10 DIAGNOSIS — N40.1 BPH WITH OBSTRUCTION/LOWER URINARY TRACT SYMPTOMS: Primary | ICD-10-CM

## 2023-01-10 DIAGNOSIS — N13.8 BPH WITH OBSTRUCTION/LOWER URINARY TRACT SYMPTOMS: Primary | ICD-10-CM

## 2023-01-10 RX ORDER — FINASTERIDE 5 MG/1
TABLET, FILM COATED ORAL
COMMUNITY
Start: 2023-01-07

## 2023-01-10 NOTE — PROGRESS NOTES
1/10/2023    Ana Paula Hardin  1955  [id number]    Diagnosis      Patient presents for diaz removal managed by Dr Jaylen Mendez  Return to office for post op follow up as scheduled    Procedure Diaz removal/voiding trial    Diaz catheter removed after deflation of an intact balloon  Patient tolerated well  Encouraged patient to hydrate well and return this afternoon for post void residual   he knows he may return early if uncomfortable and unable to urinate  Patient agrees to this plan  I spoke with patient to see how he was doing since diaz removal this morning  Patient stated he has urinated with a stream  He will not be coming back this afternoon  ER precautions reviewed  No results found for this or any previous visit (from the past 4 hour(s))          Vitals:    01/10/23 0838   BP: 162/98   BP Location: Left arm   Patient Position: Sitting   Cuff Size: Standard   Pulse: 100   Weight: 82 5 kg (181 lb 12 8 oz)   Height: 5' 7" (1 702 m)           Anish Bergeron RN

## 2023-01-10 NOTE — TELEPHONE ENCOUNTER
Spoke with pt's wife and advised work note is written  She stated she will try to print out from [employer] and if she has any issues printing it she will contact office and we can print it out here

## 2023-01-10 NOTE — TELEPHONE ENCOUNTER
Ok to provide work note to be out until next Monday 1/16/23? Note is typed up already     S/P LON with Sanju Castillo on 1/6/23

## 2023-01-10 NOTE — TELEPHONE ENCOUNTER
Epi titrated down to 0.07 per MD Kuldip.     Audrey Castellanos, RN  01/13/21 5233 OK to provide work note

## 2023-01-13 NOTE — TELEPHONE ENCOUNTER
Patient'ts wife is requesting a call back from the office to her  regarding the note back to work  She states she cannot find the note in [employer]

## 2023-01-16 ENCOUNTER — TELEPHONE (OUTPATIENT)
Dept: UROLOGY | Facility: CLINIC | Age: 68
End: 2023-01-16

## 2023-01-16 NOTE — TELEPHONE ENCOUNTER
----- Message from Toyin Mills sent at 1/14/2023  9:03 AM EST -----  Regarding: Return to work Letter  Contact: [phone]  Good Morning Dr Peterson Kincaid  thank you for writting the letter for Rosa Gaytan to return to work on Monday January 16th  His employer would like to have that update with the same dates, however they would like to know if he will have any restrictions  We would appreciate it this could be corrected and then have it mailed to us  Thank you      [place]

## 2023-01-17 NOTE — TELEPHONE ENCOUNTER
Of course he does and this was discussed with him at his pre-op visit and upon discharge from the hospital  As with any post TURP pt- no heavy lifting until seen in the office  Avoid vigorous exercise and bicycle riding  No driving while taking pain medication  Maintain hydration upwards to 40-60 oz per day/ If increase in bloody urine pt will need to back off on some physical activity until it resolves   Keep follow up appointment scheduled 1/26/2023

## 2023-01-26 ENCOUNTER — OFFICE VISIT (OUTPATIENT)
Dept: UROLOGY | Facility: MEDICAL CENTER | Age: 68
End: 2023-01-26

## 2023-01-26 VITALS
DIASTOLIC BLOOD PRESSURE: 68 MMHG | SYSTOLIC BLOOD PRESSURE: 140 MMHG | WEIGHT: 181 LBS | HEIGHT: 67 IN | HEART RATE: 90 BPM | BODY MASS INDEX: 28.41 KG/M2

## 2023-01-26 DIAGNOSIS — N40.1 BPH WITH OBSTRUCTION/LOWER URINARY TRACT SYMPTOMS: Primary | ICD-10-CM

## 2023-01-26 DIAGNOSIS — N13.8 BPH WITH OBSTRUCTION/LOWER URINARY TRACT SYMPTOMS: Primary | ICD-10-CM

## 2023-01-26 LAB — POST-VOID RESIDUAL VOLUME, ML POC: 312 ML

## 2023-01-26 RX ORDER — TAMSULOSIN HYDROCHLORIDE 0.4 MG/1
0.4 CAPSULE ORAL
Qty: 90 CAPSULE | Refills: 0 | Status: SHIPPED | OUTPATIENT
Start: 2023-01-26

## 2023-01-26 NOTE — PATIENT INSTRUCTIONS
Re-start Flomax and stop 1 week before your next office visit   Call the office for concerns or questions

## 2023-01-26 NOTE — PROGRESS NOTES
1/26/2023      Chief Complaint   Patient presents with   • Benign Prostatic Hypertrophy     Assessment and Plan    69 y o  male managed by Dr Silas Bagley    1  Benign prostatic hyperplasia with lower urinary tract symptoms  · Status post TURP 1/6/2023 by Dr Silas Bagley  · Bladder scan PVR-312 ml  · We will reinitiate tamsulosin x3 months  Patient will discontinue 1 week prior to next office visit  · Bladder scan PVR, AUA to be performed at next office visit  · Discussed the need to maintain adequate hydration while avoiding bladder retaining foods and beverages  · We will have patient continuing on light duty with no heavy lifting for the next 2 to 3 weeks  At which after that patient may increase activities slowly as tolerated  · Follow-up in the office in 3 months      History of Present Illness  Malcom Blackwell is a 69 y o  male here for follow up evaluation of urinary symptoms secondary to benign prostatic hyperplasia  Patient is status post TURP 1/6-2023 by Dr Silas Bagley  Postoperatively, he reports doing well with a stronger urinary stream   He does report sensation of complete bladder into with urination  He denies dysuria and hematuria  He reports passing an occasional small blood clot rarely  He is very pleased with his current urinary status  He denies significant abdominal pain, flank pain, recurrence of urinary tract infections  Review of Systems   Constitutional: Negative for chills and fever  Respiratory: Negative for cough and shortness of breath  Cardiovascular: Negative for chest pain  Gastrointestinal: Negative for abdominal distention, abdominal pain, blood in stool, nausea and vomiting  Genitourinary: Negative for difficulty urinating, dysuria, enuresis, flank pain, frequency, hematuria and urgency  Skin: Negative for rash                    Past Medical History  Past Medical History:   Diagnosis Date   • BPH with obstruction/lower urinary tract symptoms    • Cancer [facility])     testicular Ca Right   • Diabetes mellitus ([facility] [address] )    • Elevated PSA    • Erectile dysfunction    • Hypercholesteremia    • Hypertension    • Malignant neoplasm of testis [facility])    • Nocturia        Past Social History  Past Surgical History:   Procedure Laterality Date   • CARPAL TUNNEL RELEASE Right    • LAPAROSCOPIC ORCHIECTOMY  1998   • [place] TRURL ELECTROSURG RESCJ PROSTATE BLEED COMPLETE N/A 1/6/2023    Procedure: (TURP);   Surgeon: Ashley Bergman MD;  Location: [facility] OR;  Service: Urology   • PROSTATE BIOPSY  2012, 2013   • ROTATOR CUFF REPAIR Left      Social History     Tobacco Use   Smoking Status Never   Smokeless Tobacco Never       Past Family History  Family History   Problem Relation Age of Onset   • Liver cancer Mother    • Colon cancer Mother        Past Social history  Social History     Socioeconomic History   • Marital status: /Civil Union     Spouse name: Not on file   • Number of children: Not on file   • Years of education: Not on file   • Highest education level: Not on file   Occupational History   • Not on file   Tobacco Use   • Smoking status: Never   • Smokeless tobacco: Never   Vaping Use   • Vaping Use: Never used   Substance and Sexual Activity   • Alcohol use: Yes     Comment: 3 times per month   • Drug use: No   • Sexual activity: Not Currently   Other Topics Concern   • Not on file   Social History Narrative   • Not on file     Social Determinants of Health     Financial Resource Strain: Not on file   Food Insecurity: Not on file   Transportation Needs: Not on file   Physical Activity: Not on file   Stress: Not on file   Social Connections: Not on file   Intimate Partner Violence: Not on file   Housing Stability: Not on file       Current Medications  Current Outpatient Medications   Medication Sig Dispense Refill   • ASPIRIN 81 PO Take 81 mg by mouth daily     • MEGARED OMEGA-3 KRILL OIL PO Take by mouth daily     • simvastatin (ZOCOR) 20 mg tablet Take 20 mg by mouth every morning     • tamsulosin (FLOMAX) 0 4 mg Take 1 capsule (0 4 mg total) by mouth daily with dinner 90 capsule 0   • zolpidem (AMBIEN) 10 mg tablet Take 10 mg by mouth daily at bedtime     • finasteride (PROSCAR) 5 mg tablet      • hydrochlorothiazide (HYDRODIURIL) 25 mg tablet Take 25 mg by mouth daily     • ibuprofen (MOTRIN) 600 mg tablet  (Patient not taking: Reported on 1/26/2023)     • metFORMIN (GLUCOPHAGE) 500 mg tablet      • oxybutynin (DITROPAN-XL) 10 MG 24 hr tablet Take 1 tablet (10 mg total) by mouth daily at bedtime Do not take day of catheter removal Do not start before January 7, 2023  4 tablet 0     No current facility-administered medications for this visit  Allergies  No Known Allergies      The following portions of the patient's history were reviewed and updated as appropriate: allergies, current medications, past medical history, past social history, past surgical history and problem list       Vitals  Vitals:    01/26/23 1020   BP: 140/68   BP Location: Left arm   Patient Position: Sitting   Cuff Size: Large   Pulse: 90   Weight: 82 1 kg (181 lb)   Height: 5' 7" (1 702 m)           Physical Exam  Physical Exam  Vitals reviewed  Constitutional:       General: He is not in acute distress  Appearance: Normal appearance  He is normal weight  HENT:      Head: Normocephalic  Pulmonary:      Effort: No respiratory distress  Breath sounds: Normal breath sounds  Skin:     General: Skin is warm and dry  Neurological:      General: No focal deficit present  Mental Status: He is alert and oriented to person, place, and time     Psychiatric:         Mood and Affect: Mood normal          Behavior: Behavior normal        Results  Recent Results (from the past 1 hour(s))   POCT Measure PVR    Collection Time: 01/26/23 10:30 AM   Result Value Ref Range    POST-VOID RESIDUAL VOLUME, ML  mL   ]  No results found for: PSA  Lab Results   Component Value Date CALCIUM 9 5 12/31/2022    K 4 8 12/31/2022    CO2 24 12/31/2022     (H) 12/31/2022    BUN 29 (H) 12/31/2022    CREATININE 2 10 (H) 12/31/2022     Lab Results   Component Value Date    WBC 6 53 12/31/2022    HGB 12 1 12/31/2022    HCT 35 9 (L) 12/31/2022    MCV 96 12/31/2022     12/31/2022           Orders  Orders Placed This Encounter   Procedures   • POCT Measure PVR       CASANDRA Raymond

## 2023-05-05 ENCOUNTER — OFFICE VISIT (OUTPATIENT)
Dept: UROLOGY | Facility: MEDICAL CENTER | Age: 68
End: 2023-05-05

## 2023-05-05 VITALS
HEIGHT: 67 IN | WEIGHT: 196 LBS | BODY MASS INDEX: 30.76 KG/M2 | SYSTOLIC BLOOD PRESSURE: 140 MMHG | HEART RATE: 83 BPM | DIASTOLIC BLOOD PRESSURE: 80 MMHG

## 2023-05-05 DIAGNOSIS — N52.9 ERECTILE DYSFUNCTION, UNSPECIFIED ERECTILE DYSFUNCTION TYPE: ICD-10-CM

## 2023-05-05 DIAGNOSIS — Z12.5 PROSTATE CANCER SCREENING: ICD-10-CM

## 2023-05-05 DIAGNOSIS — N40.1 BPH WITH OBSTRUCTION/LOWER URINARY TRACT SYMPTOMS: Primary | ICD-10-CM

## 2023-05-05 DIAGNOSIS — N13.8 BPH WITH OBSTRUCTION/LOWER URINARY TRACT SYMPTOMS: Primary | ICD-10-CM

## 2023-05-05 LAB — POST-VOID RESIDUAL VOLUME, ML POC: 38 ML

## 2023-05-05 RX ORDER — SILDENAFIL 100 MG/1
100 TABLET, FILM COATED ORAL DAILY PRN
Qty: 30 TABLET | Refills: 4 | Status: SHIPPED | OUTPATIENT
Start: 2023-05-05

## 2023-05-05 RX ORDER — LISINOPRIL 10 MG/1
10 TABLET ORAL DAILY
COMMUNITY
Start: 2023-03-19

## 2023-05-05 NOTE — PROGRESS NOTES
5/5/2023    Assessment and Plan    69 y o  male managed by Dr Jayant Feng    1  Benign prostatic hyperplasia with lower urinary tract symptoms  ? Status post TURP 1/6/2023 by Dr Jayant Feng  ? Bladder scan PVR- 38 ml  ? Uroflow-inconclusive due to voided amount  ? Discussed the need to maintain adequate hydration while avoiding bladder retaining foods and beverages  ? Continue to monitor for worsening aspiration of lower urinary tract symptoms  ? Follow-up in the office in 6 months with PSA prior to visit    2   Erectile dysfunction  · Prescription for sildenafil provided    History of Present Illness  Rica Dye [address] is a 69 y o  male here for follow up evaluation of  urinary symptoms secondary to benign prostatic hyperplasia  Patient is status post TURP 1/6-2023 by Dr Jayant Feng  Postoperatively, he reports doing well with a stronger urinary stream   He does report sensation of complete bladder into with urination  He denies dysuria and hematuria  He reports passing an occasional small blood clot rarely  He is very pleased with his current urinary status  He denies significant abdominal pain, flank pain, recurrence of urinary tract infections  Since his surgical procedure, patient reports no episodes of nocturia  He reports an increase in his urinary stream with sensation of complete bladder emptying with urination  He reports complete resolution of hematuria  He is very pleased with his current lower urinary tract symptoms  Review of Systems   Constitutional: Negative for chills and fever  Respiratory: Negative for cough and shortness of breath  Cardiovascular: Negative for chest pain  Gastrointestinal: Negative for abdominal distention, abdominal pain, blood in stool, nausea and vomiting  Genitourinary: Negative for difficulty urinating, dysuria, enuresis, flank pain, frequency, hematuria and urgency  Skin: Negative for rash       Past Medical History  Past Medical History: Diagnosis Date    BPH with obstruction/lower urinary tract symptoms     [facility])     testicular Ca Right    Diabetes mellitus (HCC)     Elevated PSA     Erectile dysfunction     Hypercholesteremia     Hypertension     Malignant neoplasm of testis ([address] )     Nocturia        Past Social History  Past Surgical History:   Procedure Laterality Date    CARPAL TUNNEL RELEASE Right     LAPAROSCOPIC ORCHIECTOMY  1998    IN TRURL ELECTROSURG RESCJ PROSTATE BLEED COMPLETE N/A 1/6/2023    Procedure: (TURP);   Surgeon: Afshin Velásquez MD;  Location: [facility];  Service: Urology    PROSTATE BIOPSY  2012, 2013    ROTATOR CUFF REPAIR Left      Social History     Tobacco Use   Smoking Status Never   Smokeless Tobacco Never       Past Family History  Family History   Problem Relation Age of Onset    Liver cancer Mother     Colon cancer Mother        Past Social history  Social History     Socioeconomic History    Marital status: /Civil Union     Spouse name: Not on file    Number of children: Not on file    Years of education: Not on file    Highest education level: Not on file   Occupational History    Not on file   Tobacco Use    Smoking status: Never    Smokeless tobacco: Never   Vaping Use    Vaping Use: Never used   Substance and Sexual Activity    Alcohol use: Yes     Comment: 3 times per month    Drug use: No    Sexual activity: Not Currently   Other Topics Concern    Not on file   Social History Narrative    Not on file     Social Determinants of Health     Financial Resource Strain: Not on file   Food Insecurity: Not on file   Transportation Needs: Not on file   Physical Activity: Not on file   Stress: Not on file   Social Connections: Not on file   Intimate Partner Violence: Not on file   Housing Stability: Not on file       Current Medications  Current Outpatient Medications   Medication Sig Dispense Refill    ASPIRIN 81 PO Take 81 mg by mouth daily      finasteride (PROSCAR) 5 "mg tablet       lisinopril (ZESTRIL) 10 mg tablet Take 10 mg by mouth daily      MEGARED OMEGA-3 KRILL OIL PO Take by mouth daily      sildenafil (VIAGRA) 100 mg tablet Take 1 tablet (100 mg total) by mouth daily as needed for erectile dysfunction 30 tablet 4    zolpidem (AMBIEN) 10 mg tablet Take 10 mg by mouth daily at bedtime      hydrochlorothiazide (HYDRODIURIL) 25 mg tablet Take 25 mg by mouth daily (Patient not taking: Reported on 5/5/2023)      ibuprofen (MOTRIN) 600 mg tablet  (Patient not taking: Reported on 1/26/2023)      metFORMIN (GLUCOPHAGE) 500 mg tablet  (Patient not taking: Reported on 5/5/2023)      simvastatin (ZOCOR) 20 mg tablet Take 20 mg by mouth every morning (Patient not taking: Reported on 5/5/2023)       No current facility-administered medications for this visit  Allergies  No Known Allergies      The following portions of the patient's history were reviewed and updated as appropriate: allergies, current medications, past medical history, past social history, past surgical history and problem list       Vitals  Vitals:    05/05/23 1404   BP: 140/80   Pulse: 83   Weight: 88 9 kg (196 lb)   Height: 5' 7\" (1 702 m)           Physical Exam  Physical Exam  Vitals reviewed  Constitutional:       General: He is not in acute distress  Appearance: Normal appearance  He is normal weight  HENT:      Head: Normocephalic  Pulmonary:      Effort: No respiratory distress  Breath sounds: Normal breath sounds  Skin:     General: Skin is warm and dry  Neurological:      General: No focal deficit present  Mental Status: He is alert and oriented to person, place, and time     Psychiatric:         Mood and Affect: Mood normal          Behavior: Behavior normal        Results  Recent Results (from the past 1 hour(s))   POCT Measure PVR    Collection Time: 05/05/23  2:10 PM   Result Value Ref Range    POST-VOID RESIDUAL VOLUME, ML POC 38 mL   ]  No results found for: " PSA  Lab Results   Component Value Date    CALCIUM 9 5 12/31/2022    K 4 8 12/31/2022    CO2 24 12/31/2022     (H) 12/31/2022    BUN 29 (H) 12/31/2022    CREATININE 2 10 (H) 12/31/2022     Lab Results   Component Value Date    WBC 6 53 12/31/2022    HGB 12 1 12/31/2022    HCT 35 9 (L) 12/31/2022    MCV 96 12/31/2022     12/31/2022     Orders  Orders Placed This Encounter   Procedures    PSA, Total Screen     This is a patient instruction: This test is non-fasting  Please drink two glasses of water morning of bloodwork          Standing Status:   Future     Standing Expiration Date:   11/5/2024    POCT Measure PVR       CASANDRA Hall

## 2023-05-05 NOTE — PATIENT INSTRUCTIONS
Discontinue Tamsulosin   If you feel your urinary symptoms worsening, re-start Tamsulosin   SIldenafil prescription sent to pharmacy   PSA before next office visit

## 2023-10-12 DIAGNOSIS — N52.9 ERECTILE DYSFUNCTION, UNSPECIFIED ERECTILE DYSFUNCTION TYPE: ICD-10-CM

## 2023-10-12 RX ORDER — SILDENAFIL 100 MG/1
TABLET, FILM COATED ORAL
Qty: 30 TABLET | Refills: 4 | Status: SHIPPED | OUTPATIENT
Start: 2023-10-12

## 2023-11-03 ENCOUNTER — RA CDI HCC (OUTPATIENT)
Dept: OTHER | Facility: HOSPITAL | Age: 68
End: 2023-11-03

## 2023-11-03 NOTE — PROGRESS NOTES
[address] [facility] coding opportunities       Chart reviewed, no opportunity found: CHART REVIEWED, NO OPPORTUNITY FOUND        Patients Insurance        Commercial Insurance: Avinash White

## 2024-02-09 ENCOUNTER — OCCMED (OUTPATIENT)
Age: 69
End: 2024-02-09

## 2024-02-09 DIAGNOSIS — Z02.83 EMPLOYMENT-RELATED DRUG TESTING, ENCOUNTER FOR: Primary | ICD-10-CM

## 2024-07-26 DIAGNOSIS — N40.1 BPH WITH OBSTRUCTION/LOWER URINARY TRACT SYMPTOMS: ICD-10-CM

## 2024-07-26 DIAGNOSIS — N13.8 BPH WITH OBSTRUCTION/LOWER URINARY TRACT SYMPTOMS: ICD-10-CM

## 2024-07-26 RX ORDER — TAMSULOSIN HYDROCHLORIDE 0.4 MG/1
CAPSULE ORAL
Qty: 90 CAPSULE | Refills: 0 | Status: SHIPPED | OUTPATIENT
Start: 2024-07-26

## 2024-10-19 DIAGNOSIS — N13.8 BPH WITH OBSTRUCTION/LOWER URINARY TRACT SYMPTOMS: ICD-10-CM

## 2024-10-19 DIAGNOSIS — N40.1 BPH WITH OBSTRUCTION/LOWER URINARY TRACT SYMPTOMS: ICD-10-CM

## 2024-10-22 RX ORDER — TAMSULOSIN HYDROCHLORIDE 0.4 MG/1
CAPSULE ORAL
Qty: 30 CAPSULE | Refills: 2 | Status: SHIPPED | OUTPATIENT
Start: 2024-10-22

## 2024-12-23 DIAGNOSIS — N52.9 ERECTILE DYSFUNCTION, UNSPECIFIED ERECTILE DYSFUNCTION TYPE: ICD-10-CM

## 2024-12-24 RX ORDER — SILDENAFIL 100 MG/1
TABLET, FILM COATED ORAL
Qty: 30 TABLET | Refills: 4 | Status: SHIPPED | OUTPATIENT
Start: 2024-12-24

## 2025-01-20 DIAGNOSIS — N40.1 BPH WITH OBSTRUCTION/LOWER URINARY TRACT SYMPTOMS: ICD-10-CM

## 2025-01-20 DIAGNOSIS — N13.8 BPH WITH OBSTRUCTION/LOWER URINARY TRACT SYMPTOMS: ICD-10-CM

## 2025-01-21 RX ORDER — TAMSULOSIN HYDROCHLORIDE 0.4 MG/1
0.4 CAPSULE ORAL DAILY
Qty: 30 CAPSULE | Refills: 2 | Status: SHIPPED | OUTPATIENT
Start: 2025-01-21

## 2025-01-21 NOTE — TELEPHONE ENCOUNTER
Called patient - LMOM that a courtesy refill was sent to the pharmacy.  He is told no more refills will be authorized until an appointment is made.

## 2025-01-21 NOTE — TELEPHONE ENCOUNTER
Patient was returning a call to the office    Advised the office would be sending in a courtesy refill for him of the tamsulosin (FLOMAX) 0.4 mg     But to be able to get further refills, he needed to schedule a follow up because he was last seen in 2023    Patient said that was not a problem and asked to schedule an appt.     Patient was scheduled for next week. Noted as a Medication Refill and Check up Appt. Because the patient did not have any issues he needed to discuss

## 2025-01-27 NOTE — ASSESSMENT & PLAN NOTE
-Status post TURP 1/6/2023 by Dr. Lucas  -Continues with Flomax 0.4 mg   -PVR: 118 mL today, UA with no sign of infection.  -Discussed with pt PVR is not concerning if he feels he is emptying adequately and does not have frequent UTIs.   -Follow up in 1 year with PVR.    Orders:    POCT Measure PVR    POCT urine dip auto non-scope

## 2025-01-27 NOTE — PROGRESS NOTES
Name: Hari Hardin      : 1955      MRN: [id number]  Encounter Provider: CASANDRA Swain  Encounter Date: 2025   [facility]  :  Assessment & Plan  BPH with obstruction/lower urinary tract symptoms  -Status post TURP 2023 by Dr. Lucas  -Continues with Flomax 0.4 mg   -PVR: 118 mL today, UA with no sign of infection.  -Discussed with pt PVR is not concerning if he feels he is emptying adequately and does not have frequent UTIs.   -Follow up in 1 year with PVR.    Orders:    POCT Measure PVR    POCT urine dip auto non-scope    Erectile dysfunction, unspecified erectile dysfunction type  -Managed on sildenafil.       Prostate cancer screening  -Last PSA on file 8/15/2022 1.60.  -PSA ordered to be completed.   -Continue annual follow-up  Orders:    PSA, Total Screen; Future        History of Present Illness   Hari Hardin is a 69 y.o. male who presents for follow-up.  Patient is s/p TURP 2023 by Dr. Lucas.  He was last seen in the office 2023 at that time patient was recommended to follow-up in 6 months with a PSA however he was lost to follow-up.  Today he denies any urinary complaints including dysuria, frequency, urgency, nocturia.  He feels he is emptying adequately and he states that he is very happy with his urination after TURP.  He has not gotten an updated PSA since .    AUA SYMPTOM SCORE      Flowsheet Row Most Recent Value   AUA SYMPTOM SCORE    How often have you had a sensation of not emptying your bladder completely after you finished urinating? 5 (P)     How often have you had to urinate again less than two hours after you finished urinating? 0 (P)     How often have you found you stopped and started again several times when you urinate? 0 (P)     How often have you found it difficult to postpone urination? 0 (P)     How often have you had a weak urinary stream? 0 (P)     How often have you had to push or  "strain to begin urination? 0 (P)     How many times did you most typically get up to urinate from the time you went to bed at night until the time you got up in the morning? 0 (P)     Quality of Life: If you were to spend the rest of your life with your urinary condition just the way it is now, how would you feel about that? 1 (P)     AUA SYMPTOM SCORE 5 (P)            Review of Systems   Constitutional:  Negative for chills and fever.   Gastrointestinal:  Negative for abdominal pain.   Genitourinary:  Negative for difficulty urinating, dysuria, flank pain, frequency, hematuria and urgency.          Objective   There were no vitals taken for this visit.    Physical Exam  Vitals reviewed.   Constitutional:       General: He is not in acute distress.     Appearance: Normal appearance. He is normal weight. He is not toxic-appearing.   HENT:      Head: Normocephalic and atraumatic.   Eyes:      Conjunctiva/sclera: Conjunctivae normal.   Cardiovascular:      Rate and Rhythm: Normal rate.   Pulmonary:      Effort: Pulmonary effort is normal.   Abdominal:      Palpations: Abdomen is soft.   Genitourinary:     Comments: Digital rectal exam smooth prostate, without appreciable nodule, induration or asymmetry   Musculoskeletal:         General: Normal range of motion.      Cervical back: Normal range of motion.   Skin:     General: Skin is warm and dry.   Neurological:      Mental Status: He is alert and oriented to person, place, and time.   Psychiatric:         Mood and Affect: Mood normal.         Behavior: Behavior normal.          Results  No results found for: \"PSA\"  Lab Results   Component Value Date    CALCIUM 9.2 11/05/2024    K 3.9 11/05/2024    CO2 23 11/05/2024     11/05/2024    BUN 24 11/05/2024    CREATININE 1.64 (H) 11/05/2024     Lab Results   Component Value Date    WBC 6.53 12/31/2022    HGB 12.1 12/31/2022    HCT 35.9 (L) 12/31/2022    MCV 96 12/31/2022     12/31/2022       Office Urine Dip  No " results found for this or any previous visit (from the past hour).]

## 2025-01-29 ENCOUNTER — OFFICE VISIT (OUTPATIENT)
Dept: UROLOGY | Facility: MEDICAL CENTER | Age: 70
End: 2025-01-29
Payer: COMMERCIAL

## 2025-01-29 VITALS
HEIGHT: 67 IN | HEART RATE: 86 BPM | OXYGEN SATURATION: 95 % | DIASTOLIC BLOOD PRESSURE: 80 MMHG | WEIGHT: 195 LBS | SYSTOLIC BLOOD PRESSURE: 110 MMHG | BODY MASS INDEX: 30.61 KG/M2

## 2025-01-29 DIAGNOSIS — N40.1 BPH WITH OBSTRUCTION/LOWER URINARY TRACT SYMPTOMS: Primary | ICD-10-CM

## 2025-01-29 DIAGNOSIS — N13.8 BPH WITH OBSTRUCTION/LOWER URINARY TRACT SYMPTOMS: Primary | ICD-10-CM

## 2025-01-29 DIAGNOSIS — Z12.5 PROSTATE CANCER SCREENING: ICD-10-CM

## 2025-01-29 DIAGNOSIS — N52.9 ERECTILE DYSFUNCTION, UNSPECIFIED ERECTILE DYSFUNCTION TYPE: ICD-10-CM

## 2025-01-29 LAB
POST-VOID RESIDUAL VOLUME, ML POC: 118 ML
SL AMB  POCT GLUCOSE, UA: NORMAL
SL AMB LEUKOCYTE ESTERASE,UA: NORMAL
SL AMB POCT BILIRUBIN,UA: NORMAL
SL AMB POCT BLOOD,UA: NORMAL
SL AMB POCT CLARITY,UA: CLEAR
SL AMB POCT COLOR,UA: YELLOW
SL AMB POCT KETONES,UA: NORMAL
SL AMB POCT NITRITE,UA: NORMAL
SL AMB POCT PH,UA: 5.5
SL AMB POCT SPECIFIC GRAVITY,UA: 1.01
SL AMB POCT URINE PROTEIN: NORMAL
SL AMB POCT UROBILINOGEN: 0.2

## 2025-01-29 PROCEDURE — 51798 US URINE CAPACITY MEASURE: CPT

## 2025-01-29 PROCEDURE — 81003 URINALYSIS AUTO W/O SCOPE: CPT

## 2025-01-29 PROCEDURE — 99213 OFFICE O/P EST LOW 20 MIN: CPT

## 2025-01-29 RX ORDER — ZOLPIDEM TARTRATE 5 MG/1
5 TABLET ORAL
COMMUNITY

## 2025-01-29 RX ORDER — FAMOTIDINE 20 MG/1
20 TABLET, FILM COATED ORAL 2 TIMES DAILY
COMMUNITY
Start: 2024-11-06 | End: 2025-11-06

## 2025-01-29 RX ORDER — LOSARTAN POTASSIUM 50 MG/1
50 TABLET ORAL DAILY
COMMUNITY

## 2025-01-29 RX ORDER — QUINAPRIL 5 1/1
5 TABLET ORAL DAILY
COMMUNITY

## 2025-01-29 RX ORDER — ERYTHROMYCIN 5 MG/G
0.5 OINTMENT OPHTHALMIC
COMMUNITY
Start: 2024-11-13

## 2025-02-15 DIAGNOSIS — N40.1 BPH WITH OBSTRUCTION/LOWER URINARY TRACT SYMPTOMS: ICD-10-CM

## 2025-02-15 DIAGNOSIS — N13.8 BPH WITH OBSTRUCTION/LOWER URINARY TRACT SYMPTOMS: ICD-10-CM

## 2025-02-17 RX ORDER — TAMSULOSIN HYDROCHLORIDE 0.4 MG/1
0.4 CAPSULE ORAL DAILY
Qty: 90 CAPSULE | Refills: 0 | Status: SHIPPED | OUTPATIENT
Start: 2025-02-17

## 2025-05-02 ENCOUNTER — APPOINTMENT (OUTPATIENT)
Age: 70
End: 2025-05-02

## 2025-05-23 DIAGNOSIS — N52.9 ERECTILE DYSFUNCTION, UNSPECIFIED ERECTILE DYSFUNCTION TYPE: ICD-10-CM

## 2025-05-23 DIAGNOSIS — N13.8 BPH WITH OBSTRUCTION/LOWER URINARY TRACT SYMPTOMS: ICD-10-CM

## 2025-05-23 DIAGNOSIS — N40.1 BPH WITH OBSTRUCTION/LOWER URINARY TRACT SYMPTOMS: ICD-10-CM

## 2025-05-23 RX ORDER — SILDENAFIL 100 MG/1
TABLET, FILM COATED ORAL
Qty: 30 TABLET | Refills: 4 | Status: SHIPPED | OUTPATIENT
Start: 2025-05-23

## 2025-05-23 RX ORDER — TAMSULOSIN HYDROCHLORIDE 0.4 MG/1
0.4 CAPSULE ORAL DAILY
Qty: 30 CAPSULE | Refills: 2 | Status: SHIPPED | OUTPATIENT
Start: 2025-05-23

## (undated) DEVICE — Device: Brand: OLYMPUS

## (undated) DEVICE — UROCATCH BAG

## (undated) DEVICE — INVIEW CLEAR LEGGINGS: Brand: CONVERTORS

## (undated) DEVICE — EVACUATOR BLADDER ELLIK DISP STRL

## (undated) DEVICE — Device

## (undated) DEVICE — BASIC SINGLE BASIN-LF: Brand: MEDLINE INDUSTRIES, INC.

## (undated) DEVICE — CATHETER PLUG WITH CAP: Brand: DOVER

## (undated) DEVICE — BAG URINE DRAINAGE 4000ML CONTINUOUS IRR

## (undated) DEVICE — PREMIUM DRY TRAY LF: Brand: MEDLINE INDUSTRIES, INC.

## (undated) DEVICE — PACK TUR

## (undated) DEVICE — GLOVE SRG BIOGEL 7.5

## (undated) DEVICE — TUBING SUCTION 5MM X 12 FT

## (undated) DEVICE — 3M™ STERI-STRIP™ COMPOUND BENZOIN TINCTURE 40 BAGS/CARTON 4 CARTONS/CASE C1544: Brand: 3M™ STERI-STRIP™

## (undated) DEVICE — SCD SEQUENTIAL COMPRESSION COMFORT SLEEVE MEDIUM KNEE LENGTH: Brand: KENDALL SCD

## (undated) DEVICE — CYSTO TUBING TUR Y IRRIGATION